# Patient Record
Sex: FEMALE | Race: WHITE | NOT HISPANIC OR LATINO
[De-identification: names, ages, dates, MRNs, and addresses within clinical notes are randomized per-mention and may not be internally consistent; named-entity substitution may affect disease eponyms.]

---

## 2017-01-30 ENCOUNTER — TRANSCRIPTION ENCOUNTER (OUTPATIENT)
Age: 42
End: 2017-01-30

## 2017-10-09 ENCOUNTER — APPOINTMENT (OUTPATIENT)
Dept: OBGYN | Facility: CLINIC | Age: 42
End: 2017-10-09
Payer: COMMERCIAL

## 2017-10-09 VITALS
SYSTOLIC BLOOD PRESSURE: 137 MMHG | HEIGHT: 64 IN | WEIGHT: 159 LBS | DIASTOLIC BLOOD PRESSURE: 85 MMHG | BODY MASS INDEX: 27.14 KG/M2

## 2017-10-09 PROCEDURE — 99396 PREV VISIT EST AGE 40-64: CPT

## 2017-10-10 LAB — HPV HIGH+LOW RISK DNA PNL CVX: NOT DETECTED

## 2017-10-13 LAB — CYTOLOGY CVX/VAG DOC THIN PREP: NORMAL

## 2017-11-08 ENCOUNTER — TRANSCRIPTION ENCOUNTER (OUTPATIENT)
Age: 42
End: 2017-11-08

## 2017-11-13 ENCOUNTER — APPOINTMENT (OUTPATIENT)
Dept: ORTHOPEDIC SURGERY | Facility: CLINIC | Age: 42
End: 2017-11-13
Payer: COMMERCIAL

## 2017-11-13 VITALS — DIASTOLIC BLOOD PRESSURE: 89 MMHG | HEART RATE: 89 BPM | SYSTOLIC BLOOD PRESSURE: 145 MMHG

## 2017-11-13 DIAGNOSIS — Z87.898 PERSONAL HISTORY OF OTHER SPECIFIED CONDITIONS: ICD-10-CM

## 2017-11-13 DIAGNOSIS — F41.1 GENERALIZED ANXIETY DISORDER: ICD-10-CM

## 2017-11-13 DIAGNOSIS — Z87.891 PERSONAL HISTORY OF NICOTINE DEPENDENCE: ICD-10-CM

## 2017-11-13 PROCEDURE — 99204 OFFICE O/P NEW MOD 45 MIN: CPT

## 2017-11-13 PROCEDURE — 72100 X-RAY EXAM L-S SPINE 2/3 VWS: CPT

## 2017-12-01 ENCOUNTER — APPOINTMENT (OUTPATIENT)
Dept: ORTHOPEDIC SURGERY | Facility: CLINIC | Age: 42
End: 2017-12-01
Payer: COMMERCIAL

## 2017-12-01 PROCEDURE — 99214 OFFICE O/P EST MOD 30 MIN: CPT

## 2017-12-16 ENCOUNTER — APPOINTMENT (OUTPATIENT)
Dept: MRI IMAGING | Facility: CLINIC | Age: 42
End: 2017-12-16
Payer: COMMERCIAL

## 2017-12-16 ENCOUNTER — OUTPATIENT (OUTPATIENT)
Dept: OUTPATIENT SERVICES | Facility: HOSPITAL | Age: 42
LOS: 1 days | End: 2017-12-16
Payer: COMMERCIAL

## 2017-12-16 DIAGNOSIS — M54.16 RADICULOPATHY, LUMBAR REGION: ICD-10-CM

## 2017-12-16 PROCEDURE — 72148 MRI LUMBAR SPINE W/O DYE: CPT | Mod: 26

## 2017-12-16 PROCEDURE — 72148 MRI LUMBAR SPINE W/O DYE: CPT

## 2017-12-20 ENCOUNTER — APPOINTMENT (OUTPATIENT)
Dept: ORTHOPEDIC SURGERY | Facility: CLINIC | Age: 42
End: 2017-12-20

## 2018-01-05 ENCOUNTER — INPATIENT (INPATIENT)
Facility: HOSPITAL | Age: 43
LOS: 1 days | Discharge: HOME | DRG: 552 | End: 2018-01-07
Attending: INTERNAL MEDICINE | Admitting: INTERNAL MEDICINE
Payer: COMMERCIAL

## 2018-01-05 VITALS
RESPIRATION RATE: 18 BRPM | TEMPERATURE: 99 F | OXYGEN SATURATION: 99 % | DIASTOLIC BLOOD PRESSURE: 95 MMHG | HEART RATE: 102 BPM | SYSTOLIC BLOOD PRESSURE: 169 MMHG

## 2018-01-05 DIAGNOSIS — M54.5 LOW BACK PAIN: ICD-10-CM

## 2018-01-05 LAB
ALBUMIN SERPL ELPH-MCNC: 4.4 G/DL — SIGNIFICANT CHANGE UP (ref 3.3–5)
ALP SERPL-CCNC: 81 U/L — SIGNIFICANT CHANGE UP (ref 40–120)
ALT FLD-CCNC: 14 U/L RC — SIGNIFICANT CHANGE UP (ref 10–45)
ANION GAP SERPL CALC-SCNC: 12 MMOL/L — SIGNIFICANT CHANGE UP (ref 5–17)
APPEARANCE UR: ABNORMAL
APTT BLD: 29.6 SEC — SIGNIFICANT CHANGE UP (ref 27.5–37.4)
AST SERPL-CCNC: 14 U/L — SIGNIFICANT CHANGE UP (ref 10–40)
BACTERIA # UR AUTO: ABNORMAL /HPF
BASE EXCESS BLDV CALC-SCNC: 3.2 MMOL/L — HIGH (ref -2–2)
BASOPHILS # BLD AUTO: 0.1 K/UL — SIGNIFICANT CHANGE UP (ref 0–0.2)
BASOPHILS NFR BLD AUTO: 0.6 % — SIGNIFICANT CHANGE UP (ref 0–2)
BILIRUB SERPL-MCNC: 0.2 MG/DL — SIGNIFICANT CHANGE UP (ref 0.2–1.2)
BILIRUB UR-MCNC: NEGATIVE — SIGNIFICANT CHANGE UP
BLD GP AB SCN SERPL QL: NEGATIVE — SIGNIFICANT CHANGE UP
BUN SERPL-MCNC: 13 MG/DL — SIGNIFICANT CHANGE UP (ref 7–23)
CA-I SERPL-SCNC: 1.2 MMOL/L — SIGNIFICANT CHANGE UP (ref 1.12–1.3)
CALCIUM SERPL-MCNC: 9.5 MG/DL — SIGNIFICANT CHANGE UP (ref 8.4–10.5)
CHLORIDE BLDV-SCNC: 106 MMOL/L — SIGNIFICANT CHANGE UP (ref 96–108)
CHLORIDE SERPL-SCNC: 100 MMOL/L — SIGNIFICANT CHANGE UP (ref 96–108)
CO2 BLDV-SCNC: 30 MMOL/L — SIGNIFICANT CHANGE UP (ref 22–30)
CO2 SERPL-SCNC: 26 MMOL/L — SIGNIFICANT CHANGE UP (ref 22–31)
COLOR SPEC: SIGNIFICANT CHANGE UP
CREAT SERPL-MCNC: 0.74 MG/DL — SIGNIFICANT CHANGE UP (ref 0.5–1.3)
DIFF PNL FLD: NEGATIVE — SIGNIFICANT CHANGE UP
EOSINOPHIL # BLD AUTO: 0.1 K/UL — SIGNIFICANT CHANGE UP (ref 0–0.5)
EOSINOPHIL NFR BLD AUTO: 0.9 % — SIGNIFICANT CHANGE UP (ref 0–6)
EPI CELLS # UR: ABNORMAL /HPF
GAS PNL BLDV: 136 MMOL/L — SIGNIFICANT CHANGE UP (ref 136–145)
GAS PNL BLDV: SIGNIFICANT CHANGE UP
GAS PNL BLDV: SIGNIFICANT CHANGE UP
GLUCOSE BLDV-MCNC: 96 MG/DL — SIGNIFICANT CHANGE UP (ref 70–99)
GLUCOSE SERPL-MCNC: 101 MG/DL — HIGH (ref 70–99)
GLUCOSE UR QL: NEGATIVE — SIGNIFICANT CHANGE UP
HCG SERPL-ACNC: <2 MIU/ML — LOW (ref 5–24)
HCO3 BLDV-SCNC: 28 MMOL/L — SIGNIFICANT CHANGE UP (ref 21–29)
HCT VFR BLD CALC: 38.3 % — SIGNIFICANT CHANGE UP (ref 34.5–45)
HCT VFR BLDA CALC: 39 % — SIGNIFICANT CHANGE UP (ref 39–50)
HGB BLD CALC-MCNC: 12.8 G/DL — SIGNIFICANT CHANGE UP (ref 11.5–15.5)
HGB BLD-MCNC: 12.5 G/DL — SIGNIFICANT CHANGE UP (ref 11.5–15.5)
INR BLD: 1.01 RATIO — SIGNIFICANT CHANGE UP (ref 0.88–1.16)
KETONES UR-MCNC: NEGATIVE — SIGNIFICANT CHANGE UP
LACTATE BLDV-MCNC: 1.6 MMOL/L — SIGNIFICANT CHANGE UP (ref 0.7–2)
LEUKOCYTE ESTERASE UR-ACNC: ABNORMAL
LIDOCAIN IGE QN: 34 U/L — SIGNIFICANT CHANGE UP (ref 7–60)
LYMPHOCYTES # BLD AUTO: 33.1 % — SIGNIFICANT CHANGE UP (ref 13–44)
LYMPHOCYTES # BLD AUTO: 4 K/UL — HIGH (ref 1–3.3)
MCHC RBC-ENTMCNC: 26 PG — LOW (ref 27–34)
MCHC RBC-ENTMCNC: 32.6 GM/DL — SIGNIFICANT CHANGE UP (ref 32–36)
MCV RBC AUTO: 79.7 FL — LOW (ref 80–100)
MONOCYTES # BLD AUTO: 0.9 K/UL — SIGNIFICANT CHANGE UP (ref 0–0.9)
MONOCYTES NFR BLD AUTO: 7.5 % — SIGNIFICANT CHANGE UP (ref 2–14)
NEUTROPHILS # BLD AUTO: 7 K/UL — SIGNIFICANT CHANGE UP (ref 1.8–7.4)
NEUTROPHILS NFR BLD AUTO: 57.9 % — SIGNIFICANT CHANGE UP (ref 43–77)
NITRITE UR-MCNC: NEGATIVE — SIGNIFICANT CHANGE UP
PCO2 BLDV: 46 MMHG — SIGNIFICANT CHANGE UP (ref 35–50)
PH BLDV: 7.4 — SIGNIFICANT CHANGE UP (ref 7.35–7.45)
PH UR: 7 — SIGNIFICANT CHANGE UP (ref 5–8)
PLATELET # BLD AUTO: 416 K/UL — HIGH (ref 150–400)
PO2 BLDV: 32 MMHG — SIGNIFICANT CHANGE UP (ref 25–45)
POTASSIUM BLDV-SCNC: 3.9 MMOL/L — SIGNIFICANT CHANGE UP (ref 3.5–5)
POTASSIUM SERPL-MCNC: 4.1 MMOL/L — SIGNIFICANT CHANGE UP (ref 3.5–5.3)
POTASSIUM SERPL-SCNC: 4.1 MMOL/L — SIGNIFICANT CHANGE UP (ref 3.5–5.3)
PROT SERPL-MCNC: 7.8 G/DL — SIGNIFICANT CHANGE UP (ref 6–8.3)
PROT UR-MCNC: NEGATIVE — SIGNIFICANT CHANGE UP
PROTHROM AB SERPL-ACNC: 10.9 SEC — SIGNIFICANT CHANGE UP (ref 9.8–12.7)
RBC # BLD: 4.81 M/UL — SIGNIFICANT CHANGE UP (ref 3.8–5.2)
RBC # FLD: 14.3 % — SIGNIFICANT CHANGE UP (ref 10.3–14.5)
RH IG SCN BLD-IMP: POSITIVE — SIGNIFICANT CHANGE UP
SAO2 % BLDV: 55 % — LOW (ref 67–88)
SODIUM SERPL-SCNC: 138 MMOL/L — SIGNIFICANT CHANGE UP (ref 135–145)
SP GR SPEC: 1.01 — SIGNIFICANT CHANGE UP (ref 1.01–1.02)
UROBILINOGEN FLD QL: NEGATIVE — SIGNIFICANT CHANGE UP
WBC # BLD: 12 K/UL — HIGH (ref 3.8–10.5)
WBC # FLD AUTO: 12 K/UL — HIGH (ref 3.8–10.5)
WBC UR QL: ABNORMAL /HPF (ref 0–5)

## 2018-01-05 PROCEDURE — 93010 ELECTROCARDIOGRAM REPORT: CPT

## 2018-01-05 PROCEDURE — 72149 MRI LUMBAR SPINE W/DYE: CPT | Mod: 26

## 2018-01-05 PROCEDURE — 99285 EMERGENCY DEPT VISIT HI MDM: CPT | Mod: 25

## 2018-01-05 PROCEDURE — 71045 X-RAY EXAM CHEST 1 VIEW: CPT | Mod: 26

## 2018-01-05 RX ORDER — SODIUM CHLORIDE 9 MG/ML
3 INJECTION INTRAMUSCULAR; INTRAVENOUS; SUBCUTANEOUS ONCE
Qty: 0 | Refills: 0 | Status: COMPLETED | OUTPATIENT
Start: 2018-01-05 | End: 2018-01-05

## 2018-01-05 RX ORDER — MORPHINE SULFATE 50 MG/1
4 CAPSULE, EXTENDED RELEASE ORAL ONCE
Qty: 0 | Refills: 0 | Status: DISCONTINUED | OUTPATIENT
Start: 2018-01-05 | End: 2018-01-05

## 2018-01-05 RX ORDER — CEFTRIAXONE 500 MG/1
1 INJECTION, POWDER, FOR SOLUTION INTRAMUSCULAR; INTRAVENOUS ONCE
Qty: 0 | Refills: 0 | Status: COMPLETED | OUTPATIENT
Start: 2018-01-05 | End: 2018-01-05

## 2018-01-05 RX ADMIN — MORPHINE SULFATE 4 MILLIGRAM(S): 50 CAPSULE, EXTENDED RELEASE ORAL at 18:27

## 2018-01-05 RX ADMIN — MORPHINE SULFATE 4 MILLIGRAM(S): 50 CAPSULE, EXTENDED RELEASE ORAL at 23:16

## 2018-01-05 RX ADMIN — MORPHINE SULFATE 4 MILLIGRAM(S): 50 CAPSULE, EXTENDED RELEASE ORAL at 22:01

## 2018-01-05 RX ADMIN — CEFTRIAXONE 100 GRAM(S): 500 INJECTION, POWDER, FOR SOLUTION INTRAMUSCULAR; INTRAVENOUS at 23:40

## 2018-01-05 RX ADMIN — SODIUM CHLORIDE 3 MILLILITER(S): 9 INJECTION INTRAMUSCULAR; INTRAVENOUS; SUBCUTANEOUS at 18:20

## 2018-01-05 NOTE — ED PROVIDER NOTE - MEDICAL DECISION MAKING DETAILS
41 yo F with worsening back pain after cortisone injection for 3 days. Lumbar paraspinal tenderness on palpation with positive straight leg b/l with right worse than left. Will consult ortho/spine, obtain basic labs, provide pain control. 41 yo F with worsening back pain after cortisone injection for 3 days. Lumbar paraspinal tenderness on palpation with positive straight leg b/l with right worse than left. Concern for epidural abscess. Will consult ortho/spine, MRI to r/o abscess, obtain basic labs, provide pain control. 43 yo F with worsening back pain after cortisone injection for 3 days. Lumbar paraspinal tenderness on palpation with positive straight leg b/l with right worse than left. Concern for epidural abscess. Will consult ortho/spine, obtain MRI to r/o abscess, obtain basic labs, UA, provide pain control. 43 yo F with worsening back pain after cortisone injection for 3 days. Lumbar paraspinal tenderness on palpation with positive straight leg b/l with right worse than left, normal rectal tone. Concern for epidural abscess. Will consult ortho/spine, obtain MRI to r/o abscess, obtain basic labs, UA, provide pain control.

## 2018-01-05 NOTE — CONSULT NOTE ADULT - ASSESSMENT
42F with L5 isthmic spondy and known L5-S1 HNP, here for worsened back pain and RLE radiculopathy   - Management options discussed with patient, including continued conservative management as well as operative intervention  - At this time, patient would prefer to pursue non-operative management   - FU repeat MRI L spine official read  - ESR 12, FU CRP  - WBAT  - Pain control and follow-up with Dr. Reyes as outpatient [phone] vs admit to med for pain control if necessary  - Discussed with attendings Dr. Elkins and Dr. Reyes

## 2018-01-05 NOTE — ED PROVIDER NOTE - OBJECTIVE STATEMENT
41 yo F w/PMH herniated disc (seen on MRI 12/16/17) sent in by orthopedics (Dr. Oscar Elkins) for worsening back pain x 3 days. Pt had cortisone injection 3 days ago in her lower back and since has been having worsening back pain with radiation to right leg that's worse with movement and abdominal pain. Denies fevers, bowel or bladder incontinence, n/v, CP, SOB. No allergies.   PCP Dr. Melissa Mares  [phone]

## 2018-01-05 NOTE — ED ADULT NURSE NOTE - OBJECTIVE STATEMENT
43 yo female A&OX3 presents to the ED with the c/o back pain s/p getting and epidural. Pt has a hx of herniated disc. Pt states that she had injection on tue, states that she had sudden back pain. Pt c/o diff walking and trouble standing for long periods of time. No diff urinating or having a BM. Denies fevers and chills. Back appears normal in color. ++ tenderness to touch. MAEX4

## 2018-01-05 NOTE — ED PROVIDER NOTE - PROGRESS NOTE DETAILS
Spoke to orthopedics. Requesting coags, T&S, ESR, CRP labs (ordered) and to hold ABX for now. Discussed with Dr Gerri Garcia [facility]  Juma Doss MD, Facep

## 2018-01-05 NOTE — CONSULT NOTE ADULT - SUBJECTIVE AND OBJECTIVE BOX
CC: LBP with RLE radiculopathy  HPI:   42yFemale here for worsening LBP s/p lumbar epidural steroid injection. Patient has had about 2 months of LBP, insidious onset with no apparent trigger or injury. She has been seen Dr. Elkins ([facility]). Her pain has been refractory to NSAIDs, PT, and she was sent for a lumbar steroid injection on 1/2. She was fine and ambulatory afterwards up until today when she experienced acute worsening of her back pain. She spoke to Dr. Elkins who advised her to come to [facility] ED. She states she has some tingling of her R foot, moreso on the plantar aspect. She denies having any weakness. She is able to ambulate but currently is limited by her back pain. She states her back pain is persistent and at times can be 10/10. She denies any fevers or chills at home. No falls, no trauma. She denies any changes in bowel/bladder habits, no saddle anesthesia.     Review of systems: As per HPI, otherwise negative.     PAST MEDICAL & SURGICAL HISTORY:  No pertinent past medical history  No significant past surgical history    FAMILY HISTORY:    MEDICATIONS  (STANDING):    MEDICATIONS  (PRN):    T(C): 37.3 (01-05-18 @ 17:33), Max: 37.3 (01-05-18 @ 17:33)  HR: 102 (01-05-18 @ 17:33) (102 - 102)  BP: 169/95 (01-05-18 @ 17:33) (169/95 - 169/95)  RR: 18 (01-05-18 @ 17:33) (18 - 18)  SpO2: 99% (01-05-18 @ 17:33) (99% - 99%)  Wt(kg): --                        12.5   12.0  )-----------( 416      ( 05 Jan 2018 18:42 )             38.3     01-05    138  |  100  |  13  ----------------------------<  101<H>  4.1   |  26  |  0.74    Ca    9.5      05 Jan 2018 18:42    TPro  7.8  /  Alb  4.4  /  TBili  0.2  /  DBili  x   /  AST  14  /  ALT  14  /  AlkPhos  81  01-05    ESR 12  CRP pending    EXAM:  Awake, Alert and in no acute distress  Back some TTP at L5-S1 level  B/L LE 5/5 IP/H/Q/TA/EHL/GS/FHL  SILT L2-S1, RLE decr sensation S1>L5   Bilat patellar and Achilles DTRs symmetric  Neg clonus, neg Babinski  WWP brisk cap refill    Imaging  MRI L spine from 12/16 reviewed, L5 isthmic spondy, R paracentral to far lateral disc herniation affecting R S1 nerve root

## 2018-01-06 ENCOUNTER — TRANSCRIPTION ENCOUNTER (OUTPATIENT)
Age: 43
End: 2018-01-06

## 2018-01-06 DIAGNOSIS — M54.5 LOW BACK PAIN: ICD-10-CM

## 2018-01-06 DIAGNOSIS — Z29.9 ENCOUNTER FOR PROPHYLACTIC MEASURES, UNSPECIFIED: ICD-10-CM

## 2018-01-06 PROCEDURE — 99223 1ST HOSP IP/OBS HIGH 75: CPT | Mod: GC

## 2018-01-06 PROCEDURE — 12345: CPT | Mod: GC,NC

## 2018-01-06 RX ORDER — ONDANSETRON 8 MG/1
4 TABLET, FILM COATED ORAL EVERY 6 HOURS
Qty: 0 | Refills: 0 | Status: DISCONTINUED | OUTPATIENT
Start: 2018-01-06 | End: 2018-01-06

## 2018-01-06 RX ORDER — GABAPENTIN 400 MG/1
900 CAPSULE ORAL
Qty: 0 | Refills: 0 | Status: DISCONTINUED | OUTPATIENT
Start: 2018-01-06 | End: 2018-01-07

## 2018-01-06 RX ORDER — CEFTRIAXONE 500 MG/1
1 INJECTION, POWDER, FOR SOLUTION INTRAMUSCULAR; INTRAVENOUS EVERY 24 HOURS
Qty: 0 | Refills: 0 | Status: DISCONTINUED | OUTPATIENT
Start: 2018-01-06 | End: 2018-01-07

## 2018-01-06 RX ORDER — ONDANSETRON 8 MG/1
4 TABLET, FILM COATED ORAL ONCE
Qty: 0 | Refills: 0 | Status: COMPLETED | OUTPATIENT
Start: 2018-01-06 | End: 2018-01-06

## 2018-01-06 RX ORDER — GABAPENTIN 400 MG/1
600 CAPSULE ORAL EVERY 8 HOURS
Qty: 0 | Refills: 0 | Status: DISCONTINUED | OUTPATIENT
Start: 2018-01-06 | End: 2018-01-06

## 2018-01-06 RX ORDER — HYDROMORPHONE HYDROCHLORIDE 2 MG/ML
0.5 INJECTION INTRAMUSCULAR; INTRAVENOUS; SUBCUTANEOUS ONCE
Qty: 0 | Refills: 0 | Status: DISCONTINUED | OUTPATIENT
Start: 2018-01-06 | End: 2018-01-06

## 2018-01-06 RX ORDER — KETOROLAC TROMETHAMINE 30 MG/ML
30 SYRINGE (ML) INJECTION EVERY 6 HOURS
Qty: 0 | Refills: 0 | Status: DISCONTINUED | OUTPATIENT
Start: 2018-01-06 | End: 2018-01-06

## 2018-01-06 RX ORDER — METOCLOPRAMIDE HCL 10 MG
10 TABLET ORAL EVERY 6 HOURS
Qty: 0 | Refills: 0 | Status: DISCONTINUED | OUTPATIENT
Start: 2018-01-06 | End: 2018-01-06

## 2018-01-06 RX ORDER — OXYCODONE AND ACETAMINOPHEN 5; 325 MG/1; MG/1
1 TABLET ORAL EVERY 6 HOURS
Qty: 0 | Refills: 0 | Status: DISCONTINUED | OUTPATIENT
Start: 2018-01-06 | End: 2018-01-06

## 2018-01-06 RX ORDER — GABAPENTIN 400 MG/1
600 CAPSULE ORAL
Qty: 0 | Refills: 0 | Status: DISCONTINUED | OUTPATIENT
Start: 2018-01-06 | End: 2018-01-07

## 2018-01-06 RX ORDER — SODIUM CHLORIDE 9 MG/ML
1000 INJECTION INTRAMUSCULAR; INTRAVENOUS; SUBCUTANEOUS
Qty: 0 | Refills: 0 | Status: COMPLETED | OUTPATIENT
Start: 2018-01-06 | End: 2018-01-06

## 2018-01-06 RX ORDER — MORPHINE SULFATE 50 MG/1
2 CAPSULE, EXTENDED RELEASE ORAL EVERY 6 HOURS
Qty: 0 | Refills: 0 | Status: DISCONTINUED | OUTPATIENT
Start: 2018-01-06 | End: 2018-01-06

## 2018-01-06 RX ORDER — TRAMADOL HYDROCHLORIDE 50 MG/1
50 TABLET ORAL EVERY 6 HOURS
Qty: 0 | Refills: 0 | Status: DISCONTINUED | OUTPATIENT
Start: 2018-01-06 | End: 2018-01-07

## 2018-01-06 RX ORDER — ENOXAPARIN SODIUM 100 MG/ML
40 INJECTION SUBCUTANEOUS DAILY
Qty: 0 | Refills: 0 | Status: DISCONTINUED | OUTPATIENT
Start: 2018-01-06 | End: 2018-01-07

## 2018-01-06 RX ORDER — INFLUENZA VIRUS VACCINE 15; 15; 15; 15 UG/.5ML; UG/.5ML; UG/.5ML; UG/.5ML
0.5 SUSPENSION INTRAMUSCULAR ONCE
Qty: 0 | Refills: 0 | Status: DISCONTINUED | OUTPATIENT
Start: 2018-01-06 | End: 2018-01-07

## 2018-01-06 RX ORDER — METOCLOPRAMIDE HCL 10 MG
10 TABLET ORAL EVERY 6 HOURS
Qty: 0 | Refills: 0 | Status: DISCONTINUED | OUTPATIENT
Start: 2018-01-06 | End: 2018-01-07

## 2018-01-06 RX ORDER — SODIUM CHLORIDE 9 MG/ML
1000 INJECTION INTRAMUSCULAR; INTRAVENOUS; SUBCUTANEOUS ONCE
Qty: 0 | Refills: 0 | Status: DISCONTINUED | OUTPATIENT
Start: 2018-01-06 | End: 2018-01-06

## 2018-01-06 RX ADMIN — Medication 10 MILLIGRAM(S): at 09:34

## 2018-01-06 RX ADMIN — GABAPENTIN 600 MILLIGRAM(S): 400 CAPSULE ORAL at 11:27

## 2018-01-06 RX ADMIN — Medication 30 MILLIGRAM(S): at 16:32

## 2018-01-06 RX ADMIN — SODIUM CHLORIDE 100 MILLILITER(S): 9 INJECTION INTRAMUSCULAR; INTRAVENOUS; SUBCUTANEOUS at 08:56

## 2018-01-06 RX ADMIN — ONDANSETRON 4 MILLIGRAM(S): 8 TABLET, FILM COATED ORAL at 06:48

## 2018-01-06 RX ADMIN — MORPHINE SULFATE 4 MILLIGRAM(S): 50 CAPSULE, EXTENDED RELEASE ORAL at 01:19

## 2018-01-06 RX ADMIN — GABAPENTIN 600 MILLIGRAM(S): 400 CAPSULE ORAL at 18:01

## 2018-01-06 RX ADMIN — GABAPENTIN 900 MILLIGRAM(S): 400 CAPSULE ORAL at 22:27

## 2018-01-06 RX ADMIN — HYDROMORPHONE HYDROCHLORIDE 0.5 MILLIGRAM(S): 2 INJECTION INTRAMUSCULAR; INTRAVENOUS; SUBCUTANEOUS at 02:28

## 2018-01-06 RX ADMIN — ONDANSETRON 4 MILLIGRAM(S): 8 TABLET, FILM COATED ORAL at 02:28

## 2018-01-06 RX ADMIN — Medication 30 MILLIGRAM(S): at 17:27

## 2018-01-06 RX ADMIN — CEFTRIAXONE 100 GRAM(S): 500 INJECTION, POWDER, FOR SOLUTION INTRAMUSCULAR; INTRAVENOUS at 21:16

## 2018-01-06 RX ADMIN — SODIUM CHLORIDE 100 MILLILITER(S): 9 INJECTION INTRAMUSCULAR; INTRAVENOUS; SUBCUTANEOUS at 11:27

## 2018-01-06 RX ADMIN — ENOXAPARIN SODIUM 40 MILLIGRAM(S): 100 INJECTION SUBCUTANEOUS at 11:27

## 2018-01-06 NOTE — PROGRESS NOTE ADULT - SUBJECTIVE AND OBJECTIVE BOX
Patient is a 42y old  Female who presents with a chief complaint of lower back pain (2018 04:49)      SUBJECTIVE / OVERNIGHT EVENTS:  Overnight, pt continued to have significant back and RLE pain. Pt says that IV pain medication was not effective at all and that it caused her to have significant N/V. Pt states that her pain is 8/10 in intensity. Pt is also complaining of suprapubic pain. Pt has no other complaints and denies any fevers/chills, CP, palpitations, SOB, D/C, dysuria.    MEDICATIONS  (STANDING):  cefTRIAXone   IVPB 1 Gram(s) IV Intermittent every 24 hours  enoxaparin Injectable 40 milliGRAM(s) SubCutaneous daily  gabapentin 600 milliGRAM(s) Oral every 8 hours  influenza   Vaccine 0.5 milliLiter(s) IntraMuscular once  ketorolac   Injectable 30 milliGRAM(s) IV Push every 6 hours  sodium chloride 0.9%. 1000 milliLiter(s) (100 mL/Hr) IV Continuous <Continuous>    MEDICATIONS  (PRN):  metoclopramide 10 milliGRAM(s) Oral every 6 hours PRN Nausea and Vomiting  morphine  - Injectable 2 milliGRAM(s) IV Push every 6 hours PRN breakthrough pain  oxyCODONE    5 mG/acetaminophen 325 mG 1 Tablet(s) Oral every 6 hours PRN Moderate Pain (4 - 6)      OBJECTIVE:    Vital Signs Last 24 Hrs  T(C): 36.6 (2018 04:32), Max: 37.3 (2018 17:33)  T(F): 97.9 (2018 04:32), Max: 99.2 (2018 17:33)  HR: 65 (2018 04:32) (65 - 102)  BP: 117/78 (2018 04:32) (117/78 - 169/95)  BP(mean): --  RR: 18 (2018 04:32) (16 - 18)  SpO2: 99% (2018 04:32) (99% - 100%)    CAPILLARY BLOOD GLUCOSE        I&O's Summary    2018 07:01  -  2018 07:00  --------------------------------------------------------  IN: 600 mL / OUT: 0 mL / NET: 600 mL    2018 07:01  -  2018 09:26  --------------------------------------------------------  IN: 200 mL / OUT: 0 mL / NET: 200 mL        PHYSICAL EXAM:  GENERAL: NAD, well-developed  HEAD:  Atraumatic, Normocephalic  EYES: EOMI, PERRLA, conjunctiva and sclera clear  NECK: Supple, No JVD  BACK: no CVA tenderness. TTP over the lower lumbar/ upper sacral spine  CHEST/LUNG: Clear to auscultation bilaterally; No wheeze  HEART: Regular rate and rhythm; No murmurs, rubs, or gallops  ABDOMEN: Soft, Nondistended; Bowel sounds present. TTP in the suprapubic area.  EXTREMITIES:  2+ Peripheral Pulses, No clubbing, cyanosis, or edema  PSYCH: AAOx3  NEUROLOGY: non-focal  SKIN: No rashes or lesions    LABS:                        12.5   12.0  )-----------( 416      ( 2018 18:42 )             38.3     Auto Eosinophil # 0.1   / Auto Eosinophil % 0.9   / Auto Neutrophil # 7.0   / Auto Neutrophil % 57.9  / BANDS % x        -05    138  |  100  |  13  ----------------------------<  101<H>  4.1   |  26  |  0.74    Ca    9.5      2018 18:42  TPro  7.8  /  Alb  4.4  /  TBili  0.2  /  DBili  x   /  AST  14  /  ALT  14  /  AlkPhos  81  -    PT/INR - ( 2018 19:38 )   PT: 10.9 sec;   INR: 1.01 ratio         PTT - ( 2018 19:38 )  PTT:29.6 sec      Urinalysis Basic - ( 2018 19:38 )    Color: PL Yellow / Appearance: SL Turbid / S.011 / pH: x  Gluc: x / Ketone: Negative  / Bili: Negative / Urobili: Negative   Blood: x / Protein: Negative / Nitrite: Negative   Leuk Esterase: Moderate / RBC: x / WBC 5-10 /HPF   Sq Epi: x / Non Sq Epi: Many /HPF / Bacteria: Many /HPF          RESPIRATORY  VENT:    ABG:     VBG: ( 18:42 ) - VBG - pH: 7.40  | pCO2: 46    | pO2: 32    | Lactate: 1.6        RADIOLOGY & ADDITIONAL TESTS:  (Imaging Personally Reviewed)    Consultant(s) Notes Reviewed:      Care Discussed with Consultants/Other Providers:

## 2018-01-06 NOTE — DISCHARGE NOTE ADULT - HOSPITAL COURSE
Pt is a 41 yo F w/ Hx of herniated disc, who presented to the ER for worsening lower back pain after cortisone injection from 1/3. Pt received an MRI on admission which revealed grade 1 anterolisthesis at L5-S1 secondary to chronic  bilateral L5 pars defects with uncovering of the posterior aspect of the intervertebral disc, and a moderate-sized right paracentral disc herniation which slightly posteriorly displaces the descending right S1 nerve root. Pt was seen by ortho who recommended conservative treatment and pain control...... Pt is a 43 yo F w/ Hx of herniated disc, who presented to the ER for worsening lower back pain after cortisone injection from 1/3. Pt received an MRI on admission which revealed grade 1 anterolisthesis at L5-S1 secondary to chronic  bilateral L5 pars defects with uncovering of the posterior aspect of the intervertebral disc, and a moderate-sized right paracentral disc herniation which slightly posteriorly displaces the descending right S1 nerve root. Pt was seen by ortho who recommended conservative treatment and pain control. Patient also c/o dysuria, started on ceftraixone in setting of +UA and discharged with 2 additional days of bactrim. Patient discharged on uptitrated dose of gabapentin with plans to follow-up with spinal surgeon tomorrow, 1/8/18.

## 2018-01-06 NOTE — H&P ADULT - HISTORY OF PRESENT ILLNESS
41yo F w/ Hx of herniated disc presented to ER for lower back pain control. Patient reported lower back pain started ~2 months ago. Failed NSAIDs and PT for pain control. 12/16 MRI identified L5/S1 herniation. Patient presented to ortho office 4 days ago for cortisone injection. She was fine and ambulatory afterwards up until today when she experienced acute worsening of her back pain. She spoke to Dr. Elkins (ortho) who advised her to come to [facility] ED. She states she has some tingling of her R foot, more so on the plantar aspect. She denies having any weakness. She is able to ambulate but currently is limited by her back pain. She states her back pain is persistent and at times can be 10/10. She denies any fevers or chills at home. No falls, no trauma. Otherwise, no abd pain, chest pain, urinary burning sensation. In the ER, patient remains VSSAF. Patient was given morphine 4mg x 2 as well as dilaudid for pain control. However, patient experienced significant SE including flushing, sob, and sedation. Patient was also given ceftriaxone 1gram for suspected UTI.

## 2018-01-06 NOTE — DISCHARGE NOTE ADULT - CARE PLAN
Principal Discharge DX:	Low back pain, unspecified back pain laterality, unspecified chronicity, with sciatica presence unspecified  Goal:	Pain control  Instructions for follow-up, activity and diet:	You came in because of worsening back pain following your steroid injection. You received imaging, which did not show a cause of your pain, besides your herniated disc. While in pain, please do not lift anything greater that 5 lbs. Please continue to take all medications as prescribed.  Secondary Diagnosis:	Urinary tract infection without hematuria, site unspecified  Goal:	Resolution  Instructions for follow-up, activity and diet:	While here you were found to have a UTI which was causing your lower abdominal pain. You were treated with IV antibiotics while here, and are being sent home with oral antibiotics for the rest of your antibiotics course. Please continue taking all medications as prescribed. Please follow up with your PCP within 1-2 weeks of discharge for further management.

## 2018-01-06 NOTE — H&P ADULT - NSHPLABSRESULTS_GEN_ALL_CORE
( @ 18:42)                      12.5  12.0 )-----------( 416                 38.3    Neutrophils = 7.0 (57.9%)  Lymphocytes = 4.0 (33.1%)  Eosinophils = 0.1 (0.9%)  Basophils = 0.1 (0.6%)  Monocytes = 0.9 (7.5%)  Bands = --%        138  |  100  |  13  ----------------------------<  101<H>  4.1   |  26  |  0.74    Ca    9.5      2018 18:42    TPro  7.8  /  Alb  4.4  /  TBili  0.2  /  DBili  x   /  AST  14  /  ALT  14  /  AlkPhos  81      ( 2018 19:38 )   PT: 10.9 sec;   INR: 1.01 ratio;       PTT:29.6 sec    Venous Blood Gas:   @ 18:42  7.40/46/32/28/55  VBG Lactate: 1.6    Urinalysis Basic - ( 2018 19:38 )    Color: PL Yellow / Appearance: SL Turbid / S.011 / pH: x  Gluc: x / Ketone: Negative  / Bili: Negative / Urobili: Negative   Blood: x / Protein: Negative / Nitrite: Negative   Leuk Esterase: Moderate / RBC: x / WBC 5-10 /HPF   Sq Epi: x / Non Sq Epi: Many /HPF / Bacteria: Many /HPF    MRI: No evidence of epidural abscess. Unchanged moderate right   paracentral/foraminal disc protrusion at L5/S1 with contact of the downgoing   right S1 nerve root. Peripheral enhancement around the disc protrusion   suggest presence of inflammation. Personally reviewed labs:    ( @ 18:42)                      12.5  12.0 )-----------( 416                 38.3    Neutrophils = 7.0 (57.9%)  Lymphocytes = 4.0 (33.1%)  Eosinophils = 0.1 (0.9%)  Basophils = 0.1 (0.6%)  Monocytes = 0.9 (7.5%)  Bands = --%        138  |  100  |  13  ----------------------------<  101<H>  4.1   |  26  |  0.74    Ca    9.5      2018 18:42    TPro  7.8  /  Alb  4.4  /  TBili  0.2  /  DBili  x   /  AST  14  /  ALT  14  /  AlkPhos  81      ( 2018 19:38 )   PT: 10.9 sec;   INR: 1.01 ratio;       PTT:29.6 sec    Venous Blood Gas:   @ 18:42  7.40/46/32/28/55  VBG Lactate: 1.6    Urinalysis Basic - ( 2018 19:38 )    Color: PL Yellow / Appearance: SL Turbid / S.011 / pH: x  Gluc: x / Ketone: Negative  / Bili: Negative / Urobili: Negative   Blood: x / Protein: Negative / Nitrite: Negative   Leuk Esterase: Moderate / RBC: x / WBC 5-10 /HPF   Sq Epi: x / Non Sq Epi: Many /HPF / Bacteria: Many /HPF    Reviewed MRI brain  MRI: No evidence of epidural abscess. Unchanged moderate right   paracentral/foraminal disc protrusion at L5/S1 with contact of the downgoing   right S1 nerve root. Peripheral enhancement around the disc protrusion   suggest presence of inflammation.    Personally reviewed EKG no appreciable changes.

## 2018-01-06 NOTE — DISCHARGE NOTE ADULT - CARE PROVIDER_API CALL
Melissa Mares), Internal Medicine  [address] [place] [address]  Phone: [phone]  Fax: [phone]

## 2018-01-06 NOTE — ED ADULT NURSE REASSESSMENT NOTE - NS ED NURSE REASSESS COMMENT FT1
Patient reports vomiting one time following morphine administration. Patient denies any nausea at this time and requests no antiemetics. Patient is in no acute distress at this time. Will continue to monitor. Awaiting transport to assigned room.

## 2018-01-06 NOTE — DISCHARGE NOTE ADULT - MEDICATION SUMMARY - MEDICATIONS TO CHANGE
I will SWITCH the dose or number of times a day I take the medications listed below when I get home from the hospital:    gabapentin 300 mg oral tablet  -- 2 tab(s) by mouth every 8 hours

## 2018-01-06 NOTE — H&P ADULT - NSHPPHYSICALEXAM_GEN_ALL_CORE
T(C): 36.6 (01-06-18 @ 04:32), Max: 37.3 (01-05-18 @ 17:33)  HR: 65 (01-06-18 @ 04:32) (65 - 102)  BP: 117/78 (01-06-18 @ 04:32) (117/78 - 169/95)  RR: 18 (01-06-18 @ 04:32) (16 - 18)  SpO2: 99% (01-06-18 @ 04:32) (99% - 100%)  Wt(kg): --  GENERAL: NAD, well-developed  HEAD:  Atraumatic, Normocephalic  EYES: EOMI, PERRLA, conjunctiva and sclera clear  NECK: Supple, No JVD  CHEST/LUNG: Clear to auscultation bilaterally; No wheeze  HEART: Regular rate and rhythm; No murmurs, rubs, or gallops  ABDOMEN: Soft, Nontender, Nondistended; Bowel sounds present  EXTREMITIES:  2+ Peripheral Pulses, No clubbing, cyanosis, or edema  PSYCH: AAOx3  NEUROLOGY: 5/5 strength in upper and lower extremities. Lumbar paraspinal tenderness on palpation.  SKIN: No rashes or lesions

## 2018-01-06 NOTE — PROGRESS NOTE ADULT - ASSESSMENT
43yo F w/ Hx of herniated disc presented to ER for worsening lower back pain control after cortisone injection from 1/3 s/p MRI revealing spondylolisthesis and  L5-S1 herniated disc,

## 2018-01-06 NOTE — H&P ADULT - ATTENDING COMMENTS
43yo F w/ Hx of herniated disc presented to ER for lower back pain control. Patient reported lower back pain started ~2-3 months ago. Describes the pain as a sharp stabbing pain, worsening with sitting and standing, most improved when lying down. Recently difficult to function at home. She has tried multiple muscle relaxants outpatient. Has been on gabapentin 600 TID for several weeks and most recently took 900mg prior to going to the ED. Recently had lumbar steroid injection on 1/2. Thereafter had worsening of pain. Confirmed HPI and ROS with patient.   Vitals reviewed and physically examined patient: Agree with resident's findings above.  Labs, imaging and EKG personally reviewed  Spondylolisthesis, L5-S1 herniated disc: discussed with ortho resident, Dr Ruiz. Patient prefers to undergo conservative management deferring surgical intervention at this time. Ortho team agreeable to Percocet, Toradol, and Gabapentin at this time. Morphine IV prn to breakthrough pain. Consider pain consult in AM  Zofran prn for nausea/emesis. PT eval placed  Remainder of plan as above.  Patient previously unknown to me and I was assigned to case. I have precepted this case with housestaff resident, Dr. Webster .Primary day team to assume care in AM.

## 2018-01-06 NOTE — H&P ADULT - PROBLEM SELECTOR PLAN 1
- Likely secondary to L5/S1 herniation. Unchanged on repeated MRI.  - Ortho on consult and recommended conservative management per patient's request.  - Will give Toradol prn severe pain. Low dose morphine for breakthrough pain.  - Zofran prn for n/v. - Likely secondary to L5/S1 herniation. Unchanged on repeated MRI.  - Ortho on consult and recommended conservative management per patient's request.  - Continue Gabapentin in setting of radiculopathy/neuropathic pain  - Will give Toradol prn severe pain. Low dose morphine for breakthrough pain.  - Zofran prn for n/v.  - Consider pain consult in AM

## 2018-01-06 NOTE — PROGRESS NOTE ADULT - PROBLEM SELECTOR PLAN 1
- Likely secondary to L5/S1 herniation. Unchanged on repeat MRI.  - Ortho consulted and recommended conservative management per patient's request.  - Continue Gabapentin 600 TID in setting of radiculopathy/neuropathic pain. would be hesitant to increase dose as pt reporting drowziness when she took 900 TID at home  - Will give Toradol prn severe pain. will d/c Morphine and Dilaudid as it is causing the pt significant N/V  - d/c Zofran. will start Reglan 10 IV q6 prn for n/v.  - t/c pain consult

## 2018-01-06 NOTE — PROGRESS NOTE ADULT - SUBJECTIVE AND OBJECTIVE BOX
Pt seen and examined. States that her pain is improved. She also states that the numbness and tingling in her right leg is a little bit better. States that she prefers to go home today if possible.    Vital Signs Last 24 Hrs  T(C): 36.6 (06 Jan 2018 04:32), Max: 37.3 (05 Jan 2018 17:33)  T(F): 97.9 (06 Jan 2018 04:32), Max: 99.2 (05 Jan 2018 17:33)  HR: 65 (06 Jan 2018 04:32) (65 - 102)  BP: 117/78 (06 Jan 2018 04:32) (117/78 - 169/95)  BP(mean): --  RR: 18 (06 Jan 2018 04:32) (16 - 18)  SpO2: 99% (06 Jan 2018 04:32) (99% - 100%)    AOx4  NAD  Back TTP L5-S1 level  B/L LE 5/5 str IP/H/Q/TA/EHL/GS/FHL  SILT L2-S1 decr sensation R S1>L5, slightly improved vs yesterday  neg clonus, neg babinski  Compartments soft and compressible    42F with isthmic spondylolisthesis, L5-S1 herniated disc   - WBAT  - Pain control  - PT/OOB  - MRI L spine largely unchanged from 12/16  - If patient improved, may follow-up with Dr. Reyes in the office next week [phone]

## 2018-01-06 NOTE — PROGRESS NOTE ADULT - PROBLEM SELECTOR PLAN 2
- Patient has a grossly positive UA, now s/p Ceftriaxone in ED  - pt complaining of suprapubic pain  - will obtain urine culture  - will continue Ceftriaxone

## 2018-01-06 NOTE — H&P ADULT - NSHPREVIEWOFSYSTEMS_GEN_ALL_CORE
REVIEW OF SYSTEMS:    CONSTITUTIONAL: No weakness, fevers or chills  EYES/ENT: No visual changes;  No vertigo or throat pain   NECK: No pain or stiffness  RESPIRATORY: No cough, wheezing, hemoptysis; No shortness of breath  CARDIOVASCULAR: No chest pain or palpitations  GASTROINTESTINAL: No abdominal or epigastric pain. No nausea, vomiting, or hematemesis; No diarrhea or constipation. No melena or hematochezia.  GENITOURINARY: No dysuria, frequency or hematuria  NEUROLOGICAL: Lower back pain, RLE numbness.  SKIN: No itching, burning, rashes, or lesions   All other review of systems is negative unless indicated above.

## 2018-01-06 NOTE — PROGRESS NOTE ADULT - PROBLEM SELECTOR PLAN 3
- DVT ppx: lovenox  - Diet: Regular diet ordered    Nito Mcnulty, PGY-1  Care Model B  Pager [phone]

## 2018-01-06 NOTE — H&P ADULT - PROBLEM SELECTOR PLAN 2
- Patient has UA concerning for bacteria colonization (no pyuria). Patient remains asymptomatic. No need for further abx.

## 2018-01-06 NOTE — H&P ADULT - NSHPSOCIALHISTORY_GEN_ALL_CORE
No smoking, no drug use, no alcohol.   Lives with spouse and 2 younger children (Ages 7 and 8)  No smoking, no drug use, no alcohol.

## 2018-01-06 NOTE — DISCHARGE NOTE ADULT - PLAN OF CARE
While here you were found to have a UTI which was causing your lower abdominal pain. You were treated with IV antibiotics while here, and are being sent home with oral antibiotics for the rest of your antibiotics course. Please continue taking all medications as prescribed. Please follow up with your PCP within 1-2 weeks of discharge for further management. Pain control You came in because of worsening back pain following your steroid injection. You received imaging, which did not show a cause of your pain, besides your herniated disc. While in pain, please do not lift anything greater that 5 lbs. Please continue to take all medications as prescribed. Resolution

## 2018-01-06 NOTE — DISCHARGE NOTE ADULT - PATIENT PORTAL LINK FT
“You can access the [facility] Patient Portal, offered by [facility], by registering with the following website: [email or web address]”

## 2018-01-06 NOTE — H&P ADULT - ASSESSMENT
43yo F w/ Hx of herniated disc presented to ER for lower back pain control after cortisone injection from 1/3. 43yo F w/ Hx of herniated disc presented to ER for worsening lower back pain control after cortisone injection from 1/3 s/p MRI revealing spondylolisthesis and  L5-S1 herniated disc,

## 2018-01-06 NOTE — DISCHARGE NOTE ADULT - MEDICATION SUMMARY - MEDICATIONS TO TAKE
I will START or STAY ON the medications listed below when I get home from the hospital:    gabapentin 300 mg oral capsule  -- 3 cap(s) by mouth 3 times a day   -- Indication: For Low back pain    SMZ-TMP  mg-160 mg oral tablet  -- 2 tab(s) by mouth 2 times a day   -- Avoid prolonged or excessive exposure to direct and/or artificial sunlight while taking this medication.  Finish all this medication unless otherwise directed by prescriber.  Medication should be taken with plenty of water.    -- Indication: For UTI (urinary tract infection)

## 2018-01-07 VITALS
OXYGEN SATURATION: 99 % | SYSTOLIC BLOOD PRESSURE: 105 MMHG | DIASTOLIC BLOOD PRESSURE: 72 MMHG | RESPIRATION RATE: 18 BRPM | TEMPERATURE: 98 F | HEART RATE: 82 BPM

## 2018-01-07 LAB
BASOPHILS # BLD AUTO: 0.1 K/UL — SIGNIFICANT CHANGE UP (ref 0–0.2)
BASOPHILS NFR BLD AUTO: 0.7 % — SIGNIFICANT CHANGE UP (ref 0–2)
CULTURE RESULTS: SIGNIFICANT CHANGE UP
EOSINOPHIL # BLD AUTO: 0.2 K/UL — SIGNIFICANT CHANGE UP (ref 0–0.5)
EOSINOPHIL NFR BLD AUTO: 2.4 % — SIGNIFICANT CHANGE UP (ref 0–6)
HCT VFR BLD CALC: 35.5 % — SIGNIFICANT CHANGE UP (ref 34.5–45)
HGB BLD-MCNC: 11.6 G/DL — SIGNIFICANT CHANGE UP (ref 11.5–15.5)
LYMPHOCYTES # BLD AUTO: 3 K/UL — SIGNIFICANT CHANGE UP (ref 1–3.3)
LYMPHOCYTES # BLD AUTO: 35.9 % — SIGNIFICANT CHANGE UP (ref 13–44)
MCHC RBC-ENTMCNC: 26.3 PG — LOW (ref 27–34)
MCHC RBC-ENTMCNC: 32.8 GM/DL — SIGNIFICANT CHANGE UP (ref 32–36)
MCV RBC AUTO: 80.2 FL — SIGNIFICANT CHANGE UP (ref 80–100)
MONOCYTES # BLD AUTO: 0.9 K/UL — SIGNIFICANT CHANGE UP (ref 0–0.9)
MONOCYTES NFR BLD AUTO: 10.4 % — SIGNIFICANT CHANGE UP (ref 2–14)
NEUTROPHILS # BLD AUTO: 4.3 K/UL — SIGNIFICANT CHANGE UP (ref 1.8–7.4)
NEUTROPHILS NFR BLD AUTO: 50.7 % — SIGNIFICANT CHANGE UP (ref 43–77)
PLATELET # BLD AUTO: 321 K/UL — SIGNIFICANT CHANGE UP (ref 150–400)
RBC # BLD: 4.42 M/UL — SIGNIFICANT CHANGE UP (ref 3.8–5.2)
RBC # FLD: 14.3 % — SIGNIFICANT CHANGE UP (ref 10.3–14.5)
SPECIMEN SOURCE: SIGNIFICANT CHANGE UP
WBC # BLD: 8.4 K/UL — SIGNIFICANT CHANGE UP (ref 3.8–10.5)
WBC # FLD AUTO: 8.4 K/UL — SIGNIFICANT CHANGE UP (ref 3.8–10.5)

## 2018-01-07 PROCEDURE — 86900 BLOOD TYPING SEROLOGIC ABO: CPT

## 2018-01-07 PROCEDURE — 83605 ASSAY OF LACTIC ACID: CPT

## 2018-01-07 PROCEDURE — A9585: CPT

## 2018-01-07 PROCEDURE — 85027 COMPLETE CBC AUTOMATED: CPT

## 2018-01-07 PROCEDURE — 84295 ASSAY OF SERUM SODIUM: CPT

## 2018-01-07 PROCEDURE — 72149 MRI LUMBAR SPINE W/DYE: CPT

## 2018-01-07 PROCEDURE — 83690 ASSAY OF LIPASE: CPT

## 2018-01-07 PROCEDURE — 87086 URINE CULTURE/COLONY COUNT: CPT

## 2018-01-07 PROCEDURE — 81001 URINALYSIS AUTO W/SCOPE: CPT

## 2018-01-07 PROCEDURE — 82330 ASSAY OF CALCIUM: CPT

## 2018-01-07 PROCEDURE — 85014 HEMATOCRIT: CPT

## 2018-01-07 PROCEDURE — 82803 BLOOD GASES ANY COMBINATION: CPT

## 2018-01-07 PROCEDURE — 82947 ASSAY GLUCOSE BLOOD QUANT: CPT

## 2018-01-07 PROCEDURE — 86901 BLOOD TYPING SEROLOGIC RH(D): CPT

## 2018-01-07 PROCEDURE — 96374 THER/PROPH/DIAG INJ IV PUSH: CPT | Mod: XU

## 2018-01-07 PROCEDURE — 85610 PROTHROMBIN TIME: CPT

## 2018-01-07 PROCEDURE — 99239 HOSP IP/OBS DSCHRG MGMT >30: CPT

## 2018-01-07 PROCEDURE — G0378: CPT

## 2018-01-07 PROCEDURE — 86140 C-REACTIVE PROTEIN: CPT

## 2018-01-07 PROCEDURE — 99285 EMERGENCY DEPT VISIT HI MDM: CPT | Mod: 25

## 2018-01-07 PROCEDURE — 84702 CHORIONIC GONADOTROPIN TEST: CPT

## 2018-01-07 PROCEDURE — 86850 RBC ANTIBODY SCREEN: CPT

## 2018-01-07 PROCEDURE — 82435 ASSAY OF BLOOD CHLORIDE: CPT

## 2018-01-07 PROCEDURE — 84132 ASSAY OF SERUM POTASSIUM: CPT

## 2018-01-07 PROCEDURE — 85652 RBC SED RATE AUTOMATED: CPT

## 2018-01-07 PROCEDURE — 85730 THROMBOPLASTIN TIME PARTIAL: CPT

## 2018-01-07 PROCEDURE — 93005 ELECTROCARDIOGRAM TRACING: CPT

## 2018-01-07 PROCEDURE — 71045 X-RAY EXAM CHEST 1 VIEW: CPT

## 2018-01-07 PROCEDURE — 80053 COMPREHEN METABOLIC PANEL: CPT

## 2018-01-07 RX ORDER — GABAPENTIN 400 MG/1
2 CAPSULE ORAL
Qty: 0 | Refills: 0 | COMMUNITY

## 2018-01-07 RX ORDER — AZTREONAM 2 G
2 VIAL (EA) INJECTION
Qty: 8 | Refills: 0
Start: 2018-01-07 | End: 2018-01-08

## 2018-01-07 RX ORDER — GABAPENTIN 400 MG/1
300 CAPSULE ORAL ONCE
Qty: 0 | Refills: 0 | Status: COMPLETED | OUTPATIENT
Start: 2018-01-07 | End: 2018-01-07

## 2018-01-07 RX ORDER — GABAPENTIN 400 MG/1
3 CAPSULE ORAL
Qty: 270 | Refills: 0
Start: 2018-01-07 | End: 2018-02-05

## 2018-01-07 RX ADMIN — GABAPENTIN 600 MILLIGRAM(S): 400 CAPSULE ORAL at 06:06

## 2018-01-07 RX ADMIN — GABAPENTIN 300 MILLIGRAM(S): 400 CAPSULE ORAL at 11:11

## 2018-01-07 NOTE — PROGRESS NOTE ADULT - PROBLEM SELECTOR PLAN 1
- Likely secondary to L5/S1 herniation. Unchanged on repeat MRI.  - Ortho consulted and recommended conservative management per patient's request.  - Continue Gabapentin 600 TID in setting of radiculopathy/neuropathic pain.   - Will give tramadol prn severe pain.   - avoid Morphine and Dilaudid as it causes pt significant N/V  - c/w Reglan 10 IV q6 prn for n/v.  - patient with appointment for spine surgeon outpatient tomorrow

## 2018-01-07 NOTE — PROGRESS NOTE ADULT - PROBLEM SELECTOR PLAN 2
- Patient has a grossly positive UA, now s/p Ceftriaxone in ED  - suprapubic pain resolved today  - f/u urine culture  - will continue Ceftriaxone for now

## 2018-01-07 NOTE — PROGRESS NOTE ADULT - ASSESSMENT
41yo F w/ Hx of herniated disc admitted for management of worsening lower back after cortisone injection from 1/3 s/p MRI revealing spondylolisthesis and  L5-S1 herniated disc, course c/b UTI.

## 2018-01-07 NOTE — PROGRESS NOTE ADULT - ATTENDING COMMENTS
Pt with improvement in her pain this AM with increased dose of gabapentin. She has appt with spine specialist tomorrow. Her  at the bedside in agreement with dc home today. All questions and concerns were addressed.     Amy Restrepo MD  Division of Hospital Medicine  Pager: [phone]  Office: [phone]

## 2018-01-07 NOTE — PROGRESS NOTE ADULT - PROBLEM SELECTOR PLAN 3
- DVT ppx: lovenox  - Diet: Regular diet ordered    Robyn Ellison, PGY3  [facility]  Pager [phone]

## 2018-01-07 NOTE — PROGRESS NOTE ADULT - SUBJECTIVE AND OBJECTIVE BOX
Patient is a 42y old  Female who presents with a chief complaint of lower back pain (2018 16:42)    SUBJECTIVE / OVERNIGHT EVENTS: Patient endorsing improvement of pain overnight. No fevers, chills, nausea, vomiting or constipation.     MEDICATIONS  (STANDING):  cefTRIAXone   IVPB 1 Gram(s) IV Intermittent every 24 hours  enoxaparin Injectable 40 milliGRAM(s) SubCutaneous daily  gabapentin 600 milliGRAM(s) Oral <User Schedule>  gabapentin 900 milliGRAM(s) Oral <User Schedule>  influenza   Vaccine 0.5 milliLiter(s) IntraMuscular once    MEDICATIONS  (PRN):  metoclopramide Injectable 10 milliGRAM(s) IV Push every 6 hours PRN Nausea or Vomiting  traMADol 50 milliGRAM(s) Oral every 6 hours PRN Severe Pain (7 - 10)      T(C): 36.8 (18 @ 05:19), Max: 36.9 (18 @ 13:35)  HR: 74 (18 @ 05:19) (74 - 85)  BP: 100/62 (18 @ 05:19) (100/62 - 125/85)  RR: 18 (18 @ 05:19) (16 - 18)  SpO2: 98% (18 @ 05:19) (97% - 98%)  CAPILLARY BLOOD GLUCOSE        I&O's Summary    2018 07:01  -  2018 07:00  --------------------------------------------------------  IN: 740 mL / OUT: 0 mL / NET: 740 mL        PHYSICAL EXAM:  GENERAL: NAD, well-developed  HEAD:  Atraumatic, Normocephalic  EYES: EOMI, conjunctiva and sclera clear  NECK: Supple  CHEST/LUNG: Clear to auscultation bilaterally; No wheeze  HEART: Regular rate and rhythm; No murmurs, rubs, or gallops  ABDOMEN: Soft, Nontender, Nondistended; Bowel sounds present  EXTREMITIES:  2+ Peripheral Pulses, No clubbing, cyanosis, or edema  PSYCH: AAOx3  NEUROLOGY: non-focal  SKIN: No rashes or lesions    LABS:                        11.6   8.4   )-----------( 321      ( 2018 07:16 )             35.5         138  |  100  |  13  ----------------------------<  101<H>  4.1   |  26  |  0.74    Ca    9.5      2018 18:42    TPro  7.8  /  Alb  4.4  /  TBili  0.2  /  DBili  x   /  AST  14  /  ALT  14  /  AlkPhos  81  -    PT/INR - ( 2018 19:38 )   PT: 10.9 sec;   INR: 1.01 ratio         PTT - ( 2018 19:38 )  PTT:29.6 sec      Urinalysis Basic - ( 2018 19:38 )    Color: PL Yellow / Appearance: SL Turbid / S.011 / pH: x  Gluc: x / Ketone: Negative  / Bili: Negative / Urobili: Negative   Blood: x / Protein: Negative / Nitrite: Negative   Leuk Esterase: Moderate / RBC: x / WBC 5-10 /HPF   Sq Epi: x / Non Sq Epi: Many /HPF / Bacteria: Many /HPF    RADIOLOGY & ADDITIONAL TESTS:    Imaging Personally Reviewed:    Consultant(s) Notes Reviewed:  orthopedics

## 2018-01-26 ENCOUNTER — OUTPATIENT (OUTPATIENT)
Dept: OUTPATIENT SERVICES | Facility: HOSPITAL | Age: 43
LOS: 1 days | End: 2018-01-26
Payer: COMMERCIAL

## 2018-01-26 VITALS
HEART RATE: 85 BPM | HEIGHT: 64 IN | WEIGHT: 160.06 LBS | SYSTOLIC BLOOD PRESSURE: 141 MMHG | OXYGEN SATURATION: 99 % | RESPIRATION RATE: 20 BRPM | TEMPERATURE: 98 F | DIASTOLIC BLOOD PRESSURE: 87 MMHG

## 2018-01-26 DIAGNOSIS — M51.27 OTHER INTERVERTEBRAL DISC DISPLACEMENT, LUMBOSACRAL REGION: ICD-10-CM

## 2018-01-26 DIAGNOSIS — M43.17 SPONDYLOLISTHESIS, LUMBOSACRAL REGION: ICD-10-CM

## 2018-01-26 DIAGNOSIS — K08.499 PARTIAL LOSS OF TEETH DUE TO OTHER SPECIFIED CAUSE, UNSPECIFIED CLASS: Chronic | ICD-10-CM

## 2018-01-26 DIAGNOSIS — Z01.818 ENCOUNTER FOR OTHER PREPROCEDURAL EXAMINATION: ICD-10-CM

## 2018-01-26 DIAGNOSIS — M43.16 SPONDYLOLISTHESIS, LUMBAR REGION: ICD-10-CM

## 2018-01-26 DIAGNOSIS — M54.5 LOW BACK PAIN: ICD-10-CM

## 2018-01-26 DIAGNOSIS — M54.16 RADICULOPATHY, LUMBAR REGION: ICD-10-CM

## 2018-01-26 PROCEDURE — G0463: CPT

## 2018-01-26 RX ORDER — SODIUM CHLORIDE 9 MG/ML
3 INJECTION INTRAMUSCULAR; INTRAVENOUS; SUBCUTANEOUS EVERY 8 HOURS
Qty: 0 | Refills: 0 | Status: DISCONTINUED | OUTPATIENT
Start: 2018-01-30 | End: 2018-01-30

## 2018-01-26 RX ORDER — LIDOCAINE HCL 20 MG/ML
0.2 VIAL (ML) INJECTION ONCE
Qty: 0 | Refills: 0 | Status: DISCONTINUED | OUTPATIENT
Start: 2018-01-30 | End: 2018-01-30

## 2018-01-26 RX ORDER — CEFAZOLIN SODIUM 1 G
2000 VIAL (EA) INJECTION ONCE
Qty: 0 | Refills: 0 | Status: DISCONTINUED | OUTPATIENT
Start: 2018-01-30 | End: 2018-02-14

## 2018-01-26 NOTE — H&P PST ADULT - HISTORY OF PRESENT ILLNESS
42 year old female with h/o spondylolisthesis, lumbar region is scheduled for right L5-S1 microdiscectomy.

## 2018-01-26 NOTE — H&P PST ADULT - ATTENDING COMMENTS
Pt seen and examined. No changes in health since exam in office. Pt with continued debilitating LBP and RLE radiculopathy, weakness despite meds, PT, alternative therapies, and multiple LEON. R/B/A/C of operative tx reviewed once again including but not limited to infection, blood loss/hematoma, dural leak/dural tear, need for additional operations/revisions, persistent pain, numbness, parasthesias, weakness, as well as medical/anesthetic risks including MI, VTE, PNA, UTI, blindness, multisystem organ failure and death. Pt agrees to proceed and signed fully informed consent for Right L5-S1 microdiscectomy.  No guarantees given or implied.

## 2018-01-30 ENCOUNTER — RESULT REVIEW (OUTPATIENT)
Age: 43
End: 2018-01-30

## 2018-01-30 ENCOUNTER — OUTPATIENT (OUTPATIENT)
Dept: OUTPATIENT SERVICES | Facility: HOSPITAL | Age: 43
LOS: 1 days | End: 2018-01-30
Payer: COMMERCIAL

## 2018-01-30 ENCOUNTER — TRANSCRIPTION ENCOUNTER (OUTPATIENT)
Age: 43
End: 2018-01-30

## 2018-01-30 VITALS
OXYGEN SATURATION: 100 % | DIASTOLIC BLOOD PRESSURE: 82 MMHG | TEMPERATURE: 98 F | HEIGHT: 64 IN | WEIGHT: 160.06 LBS | RESPIRATION RATE: 18 BRPM | SYSTOLIC BLOOD PRESSURE: 131 MMHG | HEART RATE: 101 BPM

## 2018-01-30 VITALS
SYSTOLIC BLOOD PRESSURE: 127 MMHG | DIASTOLIC BLOOD PRESSURE: 79 MMHG | HEART RATE: 97 BPM | RESPIRATION RATE: 17 BRPM | OXYGEN SATURATION: 98 %

## 2018-01-30 DIAGNOSIS — M54.16 RADICULOPATHY, LUMBAR REGION: ICD-10-CM

## 2018-01-30 DIAGNOSIS — M54.5 LOW BACK PAIN: ICD-10-CM

## 2018-01-30 DIAGNOSIS — M51.27 OTHER INTERVERTEBRAL DISC DISPLACEMENT, LUMBOSACRAL REGION: ICD-10-CM

## 2018-01-30 DIAGNOSIS — M43.16 SPONDYLOLISTHESIS, LUMBAR REGION: ICD-10-CM

## 2018-01-30 DIAGNOSIS — K08.499 PARTIAL LOSS OF TEETH DUE TO OTHER SPECIFIED CAUSE, UNSPECIFIED CLASS: Chronic | ICD-10-CM

## 2018-01-30 LAB — HCG UR QL: NEGATIVE — SIGNIFICANT CHANGE UP

## 2018-01-30 PROCEDURE — C1889: CPT

## 2018-01-30 PROCEDURE — 63030 LAMOT DCMPRN NRV RT 1 LMBR: CPT

## 2018-01-30 PROCEDURE — 88304 TISSUE EXAM BY PATHOLOGIST: CPT | Mod: 26

## 2018-01-30 PROCEDURE — 88304 TISSUE EXAM BY PATHOLOGIST: CPT

## 2018-01-30 PROCEDURE — 88311 DECALCIFY TISSUE: CPT

## 2018-01-30 PROCEDURE — 76000 FLUOROSCOPY <1 HR PHYS/QHP: CPT

## 2018-01-30 PROCEDURE — 88311 DECALCIFY TISSUE: CPT | Mod: 26

## 2018-01-30 PROCEDURE — 81025 URINE PREGNANCY TEST: CPT

## 2018-01-30 RX ORDER — METOCLOPRAMIDE HCL 10 MG
10 TABLET ORAL ONCE
Qty: 0 | Refills: 0 | Status: COMPLETED | OUTPATIENT
Start: 2018-01-30 | End: 2018-01-30

## 2018-01-30 RX ORDER — DIAZEPAM 5 MG
1 TABLET ORAL
Qty: 30 | Refills: 0
Start: 2018-01-30

## 2018-01-30 RX ORDER — OXYCODONE HYDROCHLORIDE 5 MG/1
10 TABLET ORAL ONCE
Qty: 0 | Refills: 0 | Status: DISCONTINUED | OUTPATIENT
Start: 2018-01-30 | End: 2018-01-30

## 2018-01-30 RX ORDER — DEXAMETHASONE 0.5 MG/5ML
4 ELIXIR ORAL ONCE
Qty: 0 | Refills: 0 | Status: COMPLETED | OUTPATIENT
Start: 2018-01-30 | End: 2018-01-30

## 2018-01-30 RX ORDER — ONDANSETRON 8 MG/1
8 TABLET, FILM COATED ORAL ONCE
Qty: 0 | Refills: 0 | Status: DISCONTINUED | OUTPATIENT
Start: 2018-01-30 | End: 2018-02-14

## 2018-01-30 RX ORDER — ONDANSETRON 8 MG/1
4 TABLET, FILM COATED ORAL ONCE
Qty: 0 | Refills: 0 | Status: COMPLETED | OUTPATIENT
Start: 2018-01-30 | End: 2018-01-30

## 2018-01-30 RX ORDER — OXYCODONE AND ACETAMINOPHEN 5; 325 MG/1; MG/1
1 TABLET ORAL EVERY 6 HOURS
Qty: 0 | Refills: 0 | Status: DISCONTINUED | OUTPATIENT
Start: 2018-01-30 | End: 2018-01-30

## 2018-01-30 RX ORDER — ONDANSETRON 8 MG/1
4 TABLET, FILM COATED ORAL EVERY 6 HOURS
Qty: 0 | Refills: 0 | Status: DISCONTINUED | OUTPATIENT
Start: 2018-01-30 | End: 2018-02-14

## 2018-01-30 RX ORDER — DIPHENHYDRAMINE HCL 50 MG
12.5 CAPSULE ORAL ONCE
Qty: 0 | Refills: 0 | Status: DISCONTINUED | OUTPATIENT
Start: 2018-01-30 | End: 2018-02-14

## 2018-01-30 RX ORDER — ONDANSETRON 8 MG/1
1 TABLET, FILM COATED ORAL
Qty: 30 | Refills: 0
Start: 2018-01-30

## 2018-01-30 RX ORDER — FAMOTIDINE 10 MG/ML
20 INJECTION INTRAVENOUS ONCE
Qty: 0 | Refills: 0 | Status: COMPLETED | OUTPATIENT
Start: 2018-01-30 | End: 2018-01-30

## 2018-01-30 RX ORDER — HYDROMORPHONE HYDROCHLORIDE 2 MG/ML
0.5 INJECTION INTRAMUSCULAR; INTRAVENOUS; SUBCUTANEOUS
Qty: 0 | Refills: 0 | Status: DISCONTINUED | OUTPATIENT
Start: 2018-01-30 | End: 2018-01-30

## 2018-01-30 RX ADMIN — Medication 10 MILLIGRAM(S): at 14:59

## 2018-01-30 RX ADMIN — Medication 4 MILLIGRAM(S): at 12:57

## 2018-01-30 RX ADMIN — FAMOTIDINE 20 MILLIGRAM(S): 10 INJECTION INTRAVENOUS at 14:30

## 2018-01-30 RX ADMIN — OXYCODONE HYDROCHLORIDE 10 MILLIGRAM(S): 5 TABLET ORAL at 08:21

## 2018-01-30 RX ADMIN — ONDANSETRON 4 MILLIGRAM(S): 8 TABLET, FILM COATED ORAL at 12:17

## 2018-01-30 RX ADMIN — HYDROMORPHONE HYDROCHLORIDE 0.5 MILLIGRAM(S): 2 INJECTION INTRAMUSCULAR; INTRAVENOUS; SUBCUTANEOUS at 13:07

## 2018-01-30 RX ADMIN — HYDROMORPHONE HYDROCHLORIDE 0.5 MILLIGRAM(S): 2 INJECTION INTRAMUSCULAR; INTRAVENOUS; SUBCUTANEOUS at 12:58

## 2018-01-30 RX ADMIN — ONDANSETRON 4 MILLIGRAM(S): 8 TABLET, FILM COATED ORAL at 14:30

## 2018-01-30 NOTE — ASU DISCHARGE PLAN (ADULT/PEDIATRIC). - MEDICATION SUMMARY - MEDICATIONS TO TAKE
I will START or STAY ON the medications listed below when I get home from the hospital:    oxyCODONE-acetaminophen 5 mg-325 mg oral tablet  -- 1 tab(s) by mouth every 6 hours, As needed, Moderate Pain (4 - 6) MDD:4 tablets  -- Indication: For Low back pain    gabapentin 300 mg oral capsule  -- 3 cap(s) by mouth 3 times a day   -- Indication: For Low back pain    diazePAM 5 mg oral tablet  -- 1 tab(s) by mouth every 8 hours, As needed, musce spasm MDD:3  -- Indication: For Low back pain    ondansetron 4 mg oral tablet  -- 1 tab(s) by mouth every 6 hours, As needed, Nausea and/or Vomiting  -- Indication: For Nausea

## 2018-01-30 NOTE — ASU DISCHARGE PLAN (ADULT/PEDIATRIC). - NOTIFY
Unable to Urinate/Fever greater than 101/Persistent Nausea and Vomiting/Pain not relieved by Medications/Numbness, tingling/Numbness, color, or temperature change to extremity

## 2018-01-30 NOTE — ASU DISCHARGE PLAN (ADULT/PEDIATRIC). - ITEMS TO FOLLOWUP WITH YOUR PHYSICIAN'S
Please follow up with Dr. Reyes in office at [address] [facility] [address], [place] [address].   Please call [phone] for appointment.

## 2018-02-12 LAB — SURGICAL PATHOLOGY STUDY: SIGNIFICANT CHANGE UP

## 2018-05-21 ENCOUNTER — MEDICATION RENEWAL (OUTPATIENT)
Age: 43
End: 2018-05-21

## 2018-06-22 ENCOUNTER — EMERGENCY (EMERGENCY)
Facility: HOSPITAL | Age: 43
LOS: 1 days | Discharge: HOME | End: 2018-06-22
Attending: EMERGENCY MEDICINE
Payer: COMMERCIAL

## 2018-06-22 VITALS
HEART RATE: 106 BPM | DIASTOLIC BLOOD PRESSURE: 101 MMHG | RESPIRATION RATE: 17 BRPM | HEIGHT: 64 IN | OXYGEN SATURATION: 100 % | SYSTOLIC BLOOD PRESSURE: 181 MMHG | TEMPERATURE: 99 F | WEIGHT: 154.98 LBS

## 2018-06-22 VITALS
DIASTOLIC BLOOD PRESSURE: 91 MMHG | RESPIRATION RATE: 18 BRPM | SYSTOLIC BLOOD PRESSURE: 156 MMHG | OXYGEN SATURATION: 100 % | HEART RATE: 98 BPM

## 2018-06-22 DIAGNOSIS — K08.499 PARTIAL LOSS OF TEETH DUE TO OTHER SPECIFIED CAUSE, UNSPECIFIED CLASS: Chronic | ICD-10-CM

## 2018-06-22 LAB
ALBUMIN SERPL ELPH-MCNC: 4.5 G/DL — SIGNIFICANT CHANGE UP (ref 3.3–5)
ALP SERPL-CCNC: 87 U/L — SIGNIFICANT CHANGE UP (ref 40–120)
ALT FLD-CCNC: 12 U/L — SIGNIFICANT CHANGE UP (ref 10–45)
ANION GAP SERPL CALC-SCNC: 12 MMOL/L — SIGNIFICANT CHANGE UP (ref 5–17)
AST SERPL-CCNC: 11 U/L — SIGNIFICANT CHANGE UP (ref 10–40)
BASOPHILS # BLD AUTO: 0.1 K/UL — SIGNIFICANT CHANGE UP (ref 0–0.2)
BASOPHILS NFR BLD AUTO: 0.7 % — SIGNIFICANT CHANGE UP (ref 0–2)
BILIRUB SERPL-MCNC: 0.1 MG/DL — LOW (ref 0.2–1.2)
BUN SERPL-MCNC: 16 MG/DL — SIGNIFICANT CHANGE UP (ref 7–23)
CA-I BLD-SCNC: 1.27 MMOL/L — SIGNIFICANT CHANGE UP (ref 1.12–1.3)
CALCIUM SERPL-MCNC: 9.4 MG/DL — SIGNIFICANT CHANGE UP (ref 8.4–10.5)
CHLORIDE SERPL-SCNC: 103 MMOL/L — SIGNIFICANT CHANGE UP (ref 96–108)
CO2 SERPL-SCNC: 24 MMOL/L — SIGNIFICANT CHANGE UP (ref 22–31)
CREAT SERPL-MCNC: 0.82 MG/DL — SIGNIFICANT CHANGE UP (ref 0.5–1.3)
EOSINOPHIL # BLD AUTO: 0 K/UL — SIGNIFICANT CHANGE UP (ref 0–0.5)
EOSINOPHIL NFR BLD AUTO: 0.4 % — SIGNIFICANT CHANGE UP (ref 0–6)
GLUCOSE SERPL-MCNC: 124 MG/DL — HIGH (ref 70–99)
HCT VFR BLD CALC: 36.5 % — SIGNIFICANT CHANGE UP (ref 34.5–45)
HGB BLD-MCNC: 11.9 G/DL — SIGNIFICANT CHANGE UP (ref 11.5–15.5)
LYMPHOCYTES # BLD AUTO: 1.3 K/UL — SIGNIFICANT CHANGE UP (ref 1–3.3)
LYMPHOCYTES # BLD AUTO: 14.5 % — SIGNIFICANT CHANGE UP (ref 13–44)
MAGNESIUM SERPL-MCNC: 2 MG/DL — SIGNIFICANT CHANGE UP (ref 1.6–2.6)
MCHC RBC-ENTMCNC: 25.3 PG — LOW (ref 27–34)
MCHC RBC-ENTMCNC: 32.5 GM/DL — SIGNIFICANT CHANGE UP (ref 32–36)
MCV RBC AUTO: 77.8 FL — LOW (ref 80–100)
MONOCYTES # BLD AUTO: 0.5 K/UL — SIGNIFICANT CHANGE UP (ref 0–0.9)
MONOCYTES NFR BLD AUTO: 5.4 % — SIGNIFICANT CHANGE UP (ref 2–14)
NEUTROPHILS # BLD AUTO: 7.2 K/UL — SIGNIFICANT CHANGE UP (ref 1.8–7.4)
NEUTROPHILS NFR BLD AUTO: 79 % — HIGH (ref 43–77)
PHOSPHATE SERPL-MCNC: 2 MG/DL — LOW (ref 2.5–4.5)
PLATELET # BLD AUTO: 366 K/UL — SIGNIFICANT CHANGE UP (ref 150–400)
POTASSIUM SERPL-MCNC: 3.8 MMOL/L — SIGNIFICANT CHANGE UP (ref 3.5–5.3)
POTASSIUM SERPL-SCNC: 3.8 MMOL/L — SIGNIFICANT CHANGE UP (ref 3.5–5.3)
PROT SERPL-MCNC: 7.9 G/DL — SIGNIFICANT CHANGE UP (ref 6–8.3)
RBC # BLD: 4.7 M/UL — SIGNIFICANT CHANGE UP (ref 3.8–5.2)
RBC # FLD: 14.8 % — HIGH (ref 10.3–14.5)
SODIUM SERPL-SCNC: 139 MMOL/L — SIGNIFICANT CHANGE UP (ref 135–145)
WBC # BLD: 9.2 K/UL — SIGNIFICANT CHANGE UP (ref 3.8–10.5)
WBC # FLD AUTO: 9.2 K/UL — SIGNIFICANT CHANGE UP (ref 3.8–10.5)

## 2018-06-22 PROCEDURE — 85027 COMPLETE CBC AUTOMATED: CPT

## 2018-06-22 PROCEDURE — 83735 ASSAY OF MAGNESIUM: CPT

## 2018-06-22 PROCEDURE — 99284 EMERGENCY DEPT VISIT MOD MDM: CPT

## 2018-06-22 PROCEDURE — 70450 CT HEAD/BRAIN W/O DYE: CPT

## 2018-06-22 PROCEDURE — 82330 ASSAY OF CALCIUM: CPT

## 2018-06-22 PROCEDURE — 84100 ASSAY OF PHOSPHORUS: CPT

## 2018-06-22 PROCEDURE — 70450 CT HEAD/BRAIN W/O DYE: CPT | Mod: 26

## 2018-06-22 PROCEDURE — 99284 EMERGENCY DEPT VISIT MOD MDM: CPT | Mod: 25

## 2018-06-22 PROCEDURE — 80053 COMPREHEN METABOLIC PANEL: CPT

## 2018-06-22 NOTE — ED PROVIDER NOTE - OBJECTIVE STATEMENT
Abdomen soft, nontender, nondistended, bowel sounds present in all 4 quadrants. 42F with PMHx of spondylolisthesis (L5-S1 level), PSHx of micro diskectomy (lower lumbar - Jan 30th, 2018), R sided leg symptoms prior to surgery mildly persistent s/p diskectomy. Pt presents to the ED with complaints of constant BL body numbness, intermittently tingling, pins and needles, more on the R than L, sometimes in her mouth, and weakness x1 week, associated with lightheadedness. Notes falling down the stairs x1 week ago, denies head injury or LOC, but notes lower back pain s/p fall and onset of numbness symptoms and upper thoracic back and chest wall pain. Denies slurred speech, blurred vision, or any other complaints at this time. Pt can no longer see her surgeon due to change in insurance.   Dr. Jorge Reyes (surg) 42F with PMHx of spondylolisthesis (L5-S1 level), PSHx of micro diskectomy (lower lumbar - Jan 30th, 2018), R sided leg symptoms prior to surgery mildly persistent s/p diskectomy. Pt presents to the ED with complaints of constant BL body numbness, intermittently tingling, pins and needles, more on the R than L, sometimes in her mouth, x1 week, associated with dizziness and lightheadedness. Notes falling down the stairs x1 week ago, denies head injury or LOC, but notes lower back pain s/p fall and onset of numbness symptoms and upper thoracic back and chest wall pain. Denies slurred speech, blurred vision, or any other complaints at this time. Pt can no longer see her surgeon due to change in insurance.   Dr. Jorge Reyes (surg)

## 2018-06-22 NOTE — ED PROVIDER NOTE - PHYSICAL EXAMINATION
Gen: WNWD NAD  HEENT: NCAT PERRL EOMI normal pharynx  Neck: supple  CV: RRR, no murmur  Lung: CTA BL  Abd: +BS soft NTND  Ext: wwp, palp pulses, FROMx4, no cce  Neuro: CN grossly intact, sensation intact, motor 5/5 throughout Gen: WNWD NAD  HEENT: NCAT PERRL EOMI normal pharynx  Neck: supple  CV: RRR, no murmur  Lung: CTA BL  Abd: +BS soft NTND  Ext: wwp, palp pulses, FROMx4, no cce  Neuro: CN grossly intact, sensation intact however reports decrease sensation to entire R side of body, face, upper ext, trunk and lower ext, motor 5/5 throughout

## 2018-06-22 NOTE — ED ADULT TRIAGE NOTE - CHIEF COMPLAINT QUOTE
R sided body numbness from head to feet x 2 days, Pt had micro diskectomy of the lower lumbar Jan 30, 1018. Recently fell down stairs 1 week ago. Denies urine and bowel incontinence

## 2018-06-22 NOTE — ED PROVIDER NOTE - MEDICAL DECISION MAKING DETAILS
42F with Hx of lumbar degenerative disc disease, here x1 week s/p fall down stairs, complaining of diffuse paresthesias to entire face and body, R greater than L, with associated dizziness and lightheadedness. Do not suspect related to disc herniation as Sx are not limited to one dermatome or peripheral nerve distribution. Will obtain labs, electrolytes, CT head to r//o central cause, but will most likely require outpatient workup. 42F with Hx of lumbar degenerative disc disease, here x1 week s/p fall down stairs, complaining of diffuse paresthesias to entire face, body, and extremities, R greater than L, with associated dizziness and lightheadedness. Do not suspect related to disc herniation as Sx are not limited to one dermatome or peripheral nerve distribution. Possible post concussive syndrome give recent fall. Will obtain labs, electrolytes, CT head to r/o central cause with sx of 1 week duration, neuro exam is otherwise non-focal so do not suspect TIA/CVA. Will most likely require outpatient neuro workup.

## 2018-06-22 NOTE — ED ADULT NURSE NOTE - OBJECTIVE STATEMENT
42 y.o female pmh lower lumbar surgery in January 2018 presenting to ED c/o body numbness from head to toe s/p fall down stairs 1 week ago. pt states she slipped and fell down a flight of steps sliding down all the steps on her buttocks and landing on her lower back and buttocks upon landing. denies any head injury or loc. states she only had buttock pain s/p the fall but began having numbness to her extremities that she now describes as "feeling numb from her head to her toes", more on the right side than the left. denies any tingling, no dizziness, blurred vision, weakness, or change in her bowel or bladder function. VS stable. safety maintained, family at the bedside.

## 2018-06-22 NOTE — ED PROVIDER NOTE - CHPI ED SYMPTOMS POS
Use your home medications and contact your OB provider if still having difficulty  Eating Tips for Morning Sickness   Hyperemesis Gravidarum  During pregnancy, you need to eat enough food to meet your needs and the needs of your baby. Severe nausea and vomiting (hyperemesis) may lead to weight loss and dehydration (too little fluid in the body).   Follow these tips to help control nausea.   1. Eat 6 to 8 small meals in a day, about two hours apart.  2. Before rising in the morning, eat a small amount of dry food. Choose from the list below.    soda crackers (saltines)    dry toast with jelly    breadsticks    dry cereal    rice cakes    pretzels    plain potatoes, rice or noodles    plain low-fat cookies or cake  3. Avoid liquids with meals. Drink liquids 30 to 60 minutes before or after eating. Sip slowly.  4. Foods and drinks should be cool or at room temperature. Try:    flavored gelatin    sherbet, sorbet or Popsicles    carbonated (fizzy) drinks    ice cubes made from juice.  5. Avoid hot drinks and foods.  6. Avoid drinks with caffeine (coffee, tea, cola drinks). They may increase stomach acid.  7. Avoid very sweet, hot or spicy foods.  8. Avoid high-fat foods such as butter, margarine, mayonnaise, wiley, gravies, pie crust, pastries and fried foods. They take longer to leave the stomach.  9. Avoid strong food odors such as fish, cabbage or broccoli. Avoid cooking odors by eating food you do not have to cook.  10. Do not lie down after eating. Rest sitting up for an hour after meals.  11. Take your prenatal vitamins with food in the evening. Tell your doctor if you cannot take them.  12. Nausea is often gone by midday. You may eat more food in the late afternoon, supper and mid-evening. Find the times best for you.  Keep a food diary to help you find foods that you can eat without problems. Try any food that appeals to you.  Menu Planning Guidelines     [employer]. Reprinted with permission.  Food groups Foods  recommended  Foods that may cause distress    Soups Low-fat broth-based and cream soups made with allowed foods. All other soups.    Meats and substitutes  (Six or more ounces daily) All lean, tender meats, poultry or fish. All should be baked, broiled or boiled. Boiled egg. Low-fat or fat-free cheeses. Fried meat, poultry or fish; highly seasoned, cured or smoked meat, poultry or fish (i.e., corned beef, luncheon meat, frankfurters, sausages, sardines, anchovies, wiley and strong flavored cheese). Peanut butter.    Fruits (Two or more servings daily; include a vitamin C source daily) Fruit juices, canned fruits, grapefruit and orange sections (without membrane). Other fresh and dried fruits, if tolerated.     Vegetables  (Three or more servings daily) Vegetable juices, cooked vegetables (i.e., asparagus, green or wax beans, beets, carrots, peas, pumpkin, winter squash, spinach and mushrooms). Raw vegetables if tolerated.  Gas-forming vegetables (i.e., dried peas and beans, corn, broccoli, onions, cauliflower, [place] sprouts, cucumbers, cabbage, turnip, rutabagas, sauerkraut, green peppers).    Bread, cereal, potato, pasta and grains  (Six or more servings daily) Enriched breads and cereals, plain crackers, potatoes, enriched rice, barley, noodles, spaghetti, macaroni and other pastas. Very coarse cereals such as bran; seeds in or on breads, rolls and crackers; breads made with nuts or dried fruits; fried breads and pastries such as doughnuts; fried potatoes, fried rice, wild rice, seasoned rice and pasta mixes.    Dessert fats Low-fat versions of cakes, cookies, custard, pudding, ice cream, frozen yogurt sherbet; ice pops, gelatin, frozen fruit bars, sorbet. Desserts containing salad dressings, nuts, coconut; high-fat desserts.    Milk and milk products (Four or more cups daily) Fat-free and low-fat milk products. Whole milk, cream.    Beverages   (Four or more cups daily) Water, decaffeinated coffee and tea,  PAIN/+numbness, +lightheadedness, +weakness fruit drinks, caffeine-free carbonated beverages, weak tea, lemonade, sports drinks. All caffeine-containing beverages (i.e., coffee, strong tea, cocoa, cola); alcoholic beverages.    Condiments and sweets Iodized salt, flavorings, low-fat gravies and sauces, herbs and spices as tolerated; sugar, syrup, honey, jelly, seedless jam, hard candies and marshmallows. Strongly flavored seasonings and condiments (i.e., catsup, pepper, barbecue sauce, chili sauce, chili pepper, horseradish, garlic, mustard and vinegar), olives, pickles, nuts, chocolate candy.    For informational purposes only. Not to replace the advice of your health care provider.   Copyright   2006 [facility]. All rights reserved. [employer] [id number] - REV 09/15.     +numbness, +lightheadedness/DIZZINESS/PAIN

## 2018-06-25 ENCOUNTER — APPOINTMENT (OUTPATIENT)
Dept: ORTHOPEDIC SURGERY | Facility: CLINIC | Age: 43
End: 2018-06-25
Payer: COMMERCIAL

## 2018-06-25 VITALS
WEIGHT: 155 LBS | HEIGHT: 64 IN | BODY MASS INDEX: 26.46 KG/M2 | SYSTOLIC BLOOD PRESSURE: 133 MMHG | DIASTOLIC BLOOD PRESSURE: 92 MMHG | HEART RATE: 108 BPM

## 2018-06-25 PROCEDURE — 99214 OFFICE O/P EST MOD 30 MIN: CPT

## 2018-06-25 RX ORDER — CYCLOBENZAPRINE HYDROCHLORIDE 10 MG/1
10 TABLET, FILM COATED ORAL
Qty: 15 | Refills: 0 | Status: DISCONTINUED | COMMUNITY
Start: 2017-11-06

## 2018-06-25 RX ORDER — NABUMETONE 500 MG/1
500 TABLET, FILM COATED ORAL
Qty: 14 | Refills: 0 | Status: DISCONTINUED | COMMUNITY
Start: 2017-11-06

## 2018-07-03 ENCOUNTER — APPOINTMENT (OUTPATIENT)
Dept: NEUROLOGY | Facility: CLINIC | Age: 43
End: 2018-07-03
Payer: COMMERCIAL

## 2018-07-03 ENCOUNTER — OTHER (OUTPATIENT)
Age: 43
End: 2018-07-03

## 2018-07-03 VITALS
BODY MASS INDEX: 27.31 KG/M2 | DIASTOLIC BLOOD PRESSURE: 82 MMHG | WEIGHT: 160 LBS | HEIGHT: 64 IN | SYSTOLIC BLOOD PRESSURE: 128 MMHG | HEART RATE: 90 BPM

## 2018-07-03 PROCEDURE — 99205 OFFICE O/P NEW HI 60 MIN: CPT

## 2018-07-06 ENCOUNTER — OTHER (OUTPATIENT)
Age: 43
End: 2018-07-06

## 2018-07-09 ENCOUNTER — APPOINTMENT (OUTPATIENT)
Dept: MRI IMAGING | Facility: CLINIC | Age: 43
End: 2018-07-09
Payer: COMMERCIAL

## 2018-07-09 ENCOUNTER — OUTPATIENT (OUTPATIENT)
Dept: OUTPATIENT SERVICES | Facility: HOSPITAL | Age: 43
LOS: 1 days | End: 2018-07-09
Payer: COMMERCIAL

## 2018-07-09 DIAGNOSIS — R41.82 ALTERED MENTAL STATUS, UNSPECIFIED: ICD-10-CM

## 2018-07-09 DIAGNOSIS — K08.499 PARTIAL LOSS OF TEETH DUE TO OTHER SPECIFIED CAUSE, UNSPECIFIED CLASS: Chronic | ICD-10-CM

## 2018-07-09 DIAGNOSIS — R20.0 ANESTHESIA OF SKIN: ICD-10-CM

## 2018-07-09 PROCEDURE — 70551 MRI BRAIN STEM W/O DYE: CPT | Mod: 26

## 2018-07-09 PROCEDURE — 70551 MRI BRAIN STEM W/O DYE: CPT

## 2018-07-09 PROCEDURE — 72141 MRI NECK SPINE W/O DYE: CPT | Mod: 26

## 2018-07-09 PROCEDURE — 72141 MRI NECK SPINE W/O DYE: CPT

## 2018-11-02 ENCOUNTER — APPOINTMENT (OUTPATIENT)
Dept: OBGYN | Facility: CLINIC | Age: 43
End: 2018-11-02
Payer: COMMERCIAL

## 2018-11-02 VITALS
SYSTOLIC BLOOD PRESSURE: 144 MMHG | BODY MASS INDEX: 28.51 KG/M2 | DIASTOLIC BLOOD PRESSURE: 80 MMHG | HEIGHT: 64 IN | WEIGHT: 167 LBS

## 2018-11-02 DIAGNOSIS — Z34.90 ENCOUNTER FOR SUPERVISION OF NORMAL PREGNANCY, UNSPECIFIED, UNSPECIFIED TRIMESTER: ICD-10-CM

## 2018-11-02 DIAGNOSIS — Z87.39 PERSONAL HISTORY OF OTHER DISEASES OF THE MUSCULOSKELETAL SYSTEM AND CONNECTIVE TISSUE: ICD-10-CM

## 2018-11-02 DIAGNOSIS — M43.17 SPONDYLOLISTHESIS, LUMBOSACRAL REGION: ICD-10-CM

## 2018-11-02 DIAGNOSIS — R20.0 ANESTHESIA OF SKIN: ICD-10-CM

## 2018-11-02 DIAGNOSIS — M51.26 OTHER INTERVERTEBRAL DISC DISPLACEMENT, LUMBAR REGION: ICD-10-CM

## 2018-11-02 DIAGNOSIS — M43.06 SPONDYLOLYSIS, LUMBAR REGION: ICD-10-CM

## 2018-11-02 DIAGNOSIS — R20.2 ANESTHESIA OF SKIN: ICD-10-CM

## 2018-11-02 PROCEDURE — 99396 PREV VISIT EST AGE 40-64: CPT

## 2018-11-02 RX ORDER — OMEPRAZOLE 20 MG/1
20 CAPSULE, DELAYED RELEASE ORAL DAILY
Qty: 30 | Refills: 1 | Status: COMPLETED | COMMUNITY
Start: 2017-11-13 | End: 2018-11-02

## 2018-11-02 RX ORDER — PREDNISONE 20 MG/1
20 TABLET ORAL
Qty: 26 | Refills: 0 | Status: COMPLETED | COMMUNITY
Start: 2017-12-01 | End: 2018-11-02

## 2018-11-02 RX ORDER — IBUPROFEN 800 MG/1
800 TABLET, FILM COATED ORAL
Qty: 90 | Refills: 0 | Status: COMPLETED | COMMUNITY
Start: 2017-11-13 | End: 2018-11-02

## 2018-11-04 ENCOUNTER — FORM ENCOUNTER (OUTPATIENT)
Age: 43
End: 2018-11-04

## 2018-11-05 ENCOUNTER — APPOINTMENT (OUTPATIENT)
Dept: MAMMOGRAPHY | Facility: IMAGING CENTER | Age: 43
End: 2018-11-05
Payer: COMMERCIAL

## 2018-11-05 ENCOUNTER — OUTPATIENT (OUTPATIENT)
Dept: OUTPATIENT SERVICES | Facility: HOSPITAL | Age: 43
LOS: 1 days | End: 2018-11-05
Payer: COMMERCIAL

## 2018-11-05 DIAGNOSIS — K08.499 PARTIAL LOSS OF TEETH DUE TO OTHER SPECIFIED CAUSE, UNSPECIFIED CLASS: Chronic | ICD-10-CM

## 2018-11-05 DIAGNOSIS — Z00.00 ENCOUNTER FOR GENERAL ADULT MEDICAL EXAMINATION WITHOUT ABNORMAL FINDINGS: ICD-10-CM

## 2018-11-05 LAB — HPV HIGH+LOW RISK DNA PNL CVX: NOT DETECTED

## 2018-11-05 PROCEDURE — 77067 SCR MAMMO BI INCL CAD: CPT | Mod: 26

## 2018-11-05 PROCEDURE — 77063 BREAST TOMOSYNTHESIS BI: CPT | Mod: 26

## 2018-11-05 PROCEDURE — 77067 SCR MAMMO BI INCL CAD: CPT

## 2018-11-05 PROCEDURE — 77063 BREAST TOMOSYNTHESIS BI: CPT

## 2018-11-07 ENCOUNTER — OTHER (OUTPATIENT)
Age: 43
End: 2018-11-07

## 2018-11-07 LAB — CYTOLOGY CVX/VAG DOC THIN PREP: NORMAL

## 2018-11-14 ENCOUNTER — FORM ENCOUNTER (OUTPATIENT)
Age: 43
End: 2018-11-14

## 2018-11-15 ENCOUNTER — APPOINTMENT (OUTPATIENT)
Dept: MAMMOGRAPHY | Facility: IMAGING CENTER | Age: 43
End: 2018-11-15
Payer: COMMERCIAL

## 2018-11-15 ENCOUNTER — OUTPATIENT (OUTPATIENT)
Dept: OUTPATIENT SERVICES | Facility: HOSPITAL | Age: 43
LOS: 1 days | End: 2018-11-15
Payer: COMMERCIAL

## 2018-11-15 ENCOUNTER — APPOINTMENT (OUTPATIENT)
Dept: ULTRASOUND IMAGING | Facility: IMAGING CENTER | Age: 43
End: 2018-11-15
Payer: COMMERCIAL

## 2018-11-15 DIAGNOSIS — K08.499 PARTIAL LOSS OF TEETH DUE TO OTHER SPECIFIED CAUSE, UNSPECIFIED CLASS: Chronic | ICD-10-CM

## 2018-11-15 DIAGNOSIS — R92.2 INCONCLUSIVE MAMMOGRAM: ICD-10-CM

## 2018-11-15 PROCEDURE — 77065 DX MAMMO INCL CAD UNI: CPT | Mod: 26,RT

## 2018-11-15 PROCEDURE — 77065 DX MAMMO INCL CAD UNI: CPT

## 2018-11-15 PROCEDURE — G0279: CPT | Mod: 26

## 2018-11-15 PROCEDURE — 76642 ULTRASOUND BREAST LIMITED: CPT | Mod: 26,RT

## 2018-11-15 PROCEDURE — G0279: CPT

## 2018-11-15 PROCEDURE — 76642 ULTRASOUND BREAST LIMITED: CPT

## 2019-01-28 ENCOUNTER — RX RENEWAL (OUTPATIENT)
Age: 44
End: 2019-01-28

## 2019-02-13 ENCOUNTER — EMERGENCY (EMERGENCY)
Facility: HOSPITAL | Age: 44
LOS: 1 days | Discharge: HOME | End: 2019-02-13
Attending: EMERGENCY MEDICINE
Payer: COMMERCIAL

## 2019-02-13 VITALS
TEMPERATURE: 98 F | DIASTOLIC BLOOD PRESSURE: 92 MMHG | SYSTOLIC BLOOD PRESSURE: 133 MMHG | HEART RATE: 84 BPM | OXYGEN SATURATION: 100 % | RESPIRATION RATE: 18 BRPM

## 2019-02-13 VITALS
HEART RATE: 90 BPM | DIASTOLIC BLOOD PRESSURE: 99 MMHG | RESPIRATION RATE: 20 BRPM | HEIGHT: 64 IN | WEIGHT: 160.06 LBS | TEMPERATURE: 98 F | OXYGEN SATURATION: 100 % | SYSTOLIC BLOOD PRESSURE: 146 MMHG

## 2019-02-13 DIAGNOSIS — K08.499 PARTIAL LOSS OF TEETH DUE TO OTHER SPECIFIED CAUSE, UNSPECIFIED CLASS: Chronic | ICD-10-CM

## 2019-02-13 LAB
ALBUMIN SERPL ELPH-MCNC: 5 G/DL — SIGNIFICANT CHANGE UP (ref 3.3–5)
ALP SERPL-CCNC: 87 U/L — SIGNIFICANT CHANGE UP (ref 40–120)
ALT FLD-CCNC: 11 U/L — SIGNIFICANT CHANGE UP (ref 10–45)
ANION GAP SERPL CALC-SCNC: 11 MMOL/L — SIGNIFICANT CHANGE UP (ref 5–17)
APTT BLD: 30.1 SEC — SIGNIFICANT CHANGE UP (ref 27.5–36.3)
AST SERPL-CCNC: 13 U/L — SIGNIFICANT CHANGE UP (ref 10–40)
BASOPHILS # BLD AUTO: 0 K/UL — SIGNIFICANT CHANGE UP (ref 0–0.2)
BASOPHILS NFR BLD AUTO: 0.5 % — SIGNIFICANT CHANGE UP (ref 0–2)
BILIRUB SERPL-MCNC: 0.2 MG/DL — SIGNIFICANT CHANGE UP (ref 0.2–1.2)
BUN SERPL-MCNC: 11 MG/DL — SIGNIFICANT CHANGE UP (ref 7–23)
CALCIUM SERPL-MCNC: 9.4 MG/DL — SIGNIFICANT CHANGE UP (ref 8.4–10.5)
CHLORIDE SERPL-SCNC: 104 MMOL/L — SIGNIFICANT CHANGE UP (ref 96–108)
CO2 SERPL-SCNC: 24 MMOL/L — SIGNIFICANT CHANGE UP (ref 22–31)
CREAT SERPL-MCNC: 0.71 MG/DL — SIGNIFICANT CHANGE UP (ref 0.5–1.3)
D DIMER BLD IA.RAPID-MCNC: <150 NG/ML DDU — SIGNIFICANT CHANGE UP
EOSINOPHIL # BLD AUTO: 0 K/UL — SIGNIFICANT CHANGE UP (ref 0–0.5)
EOSINOPHIL NFR BLD AUTO: 0.6 % — SIGNIFICANT CHANGE UP (ref 0–6)
GLUCOSE SERPL-MCNC: 107 MG/DL — HIGH (ref 70–99)
HCG SERPL-ACNC: <2 MIU/ML — SIGNIFICANT CHANGE UP
HCT VFR BLD CALC: 36 % — SIGNIFICANT CHANGE UP (ref 34.5–45)
HGB BLD-MCNC: 11.8 G/DL — SIGNIFICANT CHANGE UP (ref 11.5–15.5)
INR BLD: 1.04 RATIO — SIGNIFICANT CHANGE UP (ref 0.88–1.16)
LYMPHOCYTES # BLD AUTO: 1.4 K/UL — SIGNIFICANT CHANGE UP (ref 1–3.3)
LYMPHOCYTES # BLD AUTO: 21.2 % — SIGNIFICANT CHANGE UP (ref 13–44)
MAGNESIUM SERPL-MCNC: 2 MG/DL — SIGNIFICANT CHANGE UP (ref 1.6–2.6)
MCHC RBC-ENTMCNC: 24.2 PG — LOW (ref 27–34)
MCHC RBC-ENTMCNC: 32.9 GM/DL — SIGNIFICANT CHANGE UP (ref 32–36)
MCV RBC AUTO: 73.7 FL — LOW (ref 80–100)
MONOCYTES # BLD AUTO: 0.4 K/UL — SIGNIFICANT CHANGE UP (ref 0–0.9)
MONOCYTES NFR BLD AUTO: 5.9 % — SIGNIFICANT CHANGE UP (ref 2–14)
NEUTROPHILS # BLD AUTO: 4.9 K/UL — SIGNIFICANT CHANGE UP (ref 1.8–7.4)
NEUTROPHILS NFR BLD AUTO: 71.9 % — SIGNIFICANT CHANGE UP (ref 43–77)
PLATELET # BLD AUTO: 390 K/UL — SIGNIFICANT CHANGE UP (ref 150–400)
POTASSIUM SERPL-MCNC: 4.2 MMOL/L — SIGNIFICANT CHANGE UP (ref 3.5–5.3)
POTASSIUM SERPL-SCNC: 4.2 MMOL/L — SIGNIFICANT CHANGE UP (ref 3.5–5.3)
PROT SERPL-MCNC: 8.2 G/DL — SIGNIFICANT CHANGE UP (ref 6–8.3)
PROTHROM AB SERPL-ACNC: 11.9 SEC — SIGNIFICANT CHANGE UP (ref 10–12.9)
RBC # BLD: 4.89 M/UL — SIGNIFICANT CHANGE UP (ref 3.8–5.2)
RBC # FLD: 15 % — HIGH (ref 10.3–14.5)
SODIUM SERPL-SCNC: 139 MMOL/L — SIGNIFICANT CHANGE UP (ref 135–145)
TROPONIN T, HIGH SENSITIVITY RESULT: <6 NG/L — SIGNIFICANT CHANGE UP (ref 0–51)
TSH SERPL-MCNC: 2.58 UIU/ML — SIGNIFICANT CHANGE UP (ref 0.27–4.2)
WBC # BLD: 6.9 K/UL — SIGNIFICANT CHANGE UP (ref 3.8–10.5)
WBC # FLD AUTO: 6.9 K/UL — SIGNIFICANT CHANGE UP (ref 3.8–10.5)

## 2019-02-13 PROCEDURE — 83735 ASSAY OF MAGNESIUM: CPT

## 2019-02-13 PROCEDURE — 80053 COMPREHEN METABOLIC PANEL: CPT

## 2019-02-13 PROCEDURE — 99284 EMERGENCY DEPT VISIT MOD MDM: CPT | Mod: 25

## 2019-02-13 PROCEDURE — 71046 X-RAY EXAM CHEST 2 VIEWS: CPT | Mod: 26

## 2019-02-13 PROCEDURE — 84702 CHORIONIC GONADOTROPIN TEST: CPT

## 2019-02-13 PROCEDURE — 99285 EMERGENCY DEPT VISIT HI MDM: CPT | Mod: 25

## 2019-02-13 PROCEDURE — 84443 ASSAY THYROID STIM HORMONE: CPT

## 2019-02-13 PROCEDURE — 93005 ELECTROCARDIOGRAM TRACING: CPT

## 2019-02-13 PROCEDURE — 85610 PROTHROMBIN TIME: CPT

## 2019-02-13 PROCEDURE — 84484 ASSAY OF TROPONIN QUANT: CPT

## 2019-02-13 PROCEDURE — 85379 FIBRIN DEGRADATION QUANT: CPT

## 2019-02-13 PROCEDURE — 93010 ELECTROCARDIOGRAM REPORT: CPT | Mod: 59

## 2019-02-13 PROCEDURE — 71046 X-RAY EXAM CHEST 2 VIEWS: CPT

## 2019-02-13 PROCEDURE — 85730 THROMBOPLASTIN TIME PARTIAL: CPT

## 2019-02-13 PROCEDURE — 85027 COMPLETE CBC AUTOMATED: CPT

## 2019-02-13 RX ORDER — ASPIRIN/CALCIUM CARB/MAGNESIUM 324 MG
162 TABLET ORAL ONCE
Qty: 0 | Refills: 0 | Status: COMPLETED | OUTPATIENT
Start: 2019-02-13 | End: 2019-02-13

## 2019-02-13 RX ADMIN — Medication 162 MILLIGRAM(S): at 10:09

## 2019-02-13 NOTE — ED ADULT NURSE REASSESSMENT NOTE - NS ED NURSE REASSESS COMMENT FT1
Patient reports "feeling better," and states her heart feels like it has slowed down. plan of care discussed. safety and comfort measures maintained.

## 2019-02-13 NOTE — ED PROVIDER NOTE - OBJECTIVE STATEMENT
43F w/DJD s/p micro discectomy 1y ago, anxiety on intermittent alprazolam 1mg (last took 4d ago - takes q2w) p/w palpitations constant since 130AM. +chest pressure/burning, anxiety, nausea, lightheadedness and gen weakness. +3 episodes loose stool last night. LMP 2d ago. Denies f/c, cough, sob, syncope, LE edema, OCP use, immobilization, FH coagulopathy, FH cardiac dz, vomiting, prior stress/echo, prior holter, menorrhagia/metrorrhagia, melena/BRBPR, changes in weight/hair/skin, prior thyroid dz, FH thyroid dz, current smoking/prior drug use, focal weakness/numbness. Former daily smoker, quit 10y ago. Took advil PM x1 last night, no other medications. Denies new stressors, psych/counseling, other medications, inc back pain.

## 2019-02-13 NOTE — ED PROVIDER NOTE - CLINICAL SUMMARY MEDICAL DECISION MAKING FREE TEXT BOX
Meli: 43F w/DJD, anxiety p/w palp, cp, nausea. R/o arrhythmia, hyperthyroid, ACS, electrolyte abnormality, PNA, will dimer for PE risk assessment, and r/o symptomatic anemia. Labs, EKG/tele, ASA, CXR - anticipate OP f/u for holter.

## 2019-02-13 NOTE — ED PROVIDER NOTE - NSFOLLOWUPCLINICS_GEN_ALL_ED_FT
[facility]  Cardiology  [address] [place] [id number]  Phone: [phone]  Fax:     [facility]  Psychiatry  [id number], [place] [address]  Phone: [phone]  Fax:   Follow Up Time:

## 2019-02-13 NOTE — ED PROVIDER NOTE - CARE PLAN
Principal Discharge DX:	Palpitations  Secondary Diagnosis:	Chest pain, unspecified type  Secondary Diagnosis:	Anxiety

## 2019-02-13 NOTE — ED PROVIDER NOTE - ATTENDING CONTRIBUTION TO CARE
44 yo female, hx of anxiety, with palpitations this AM, some chest discomfort.  Sinus tach on EKG, no evidence of STEMI, otherwise NAD, conversant.  Anxiety vs thyroid dysfunction vs PE, much less likely ACS in this premenopausal female with no family history of MI at a young age.  Will check labs, trop, d-dimer, TSH, reassess.

## 2019-02-13 NOTE — ED PROVIDER NOTE - NSFOLLOWUPINSTRUCTIONS_ED_ALL_ED_FT
-please walk in to cardiology clinic tomorrow after 9AM for likely holter monitor placement    -please follow-up with a primary doctor, psychiatrist or therapist for proper anxiety treatment    -please return to ER should you develop cp, sob, dizziness, fainting, worsening palpitations, fever, suicidality

## 2019-02-13 NOTE — ED PROVIDER NOTE - INTERPRETATION
EKG reviewed for rate, rhythm, axis, intervals and segments, including QRS morphology, P wave appearance T wave appearance, SD interval, and QT interval.  I find the EKG to be unremarkable in all of these regards except as follows: sinus tach

## 2019-02-13 NOTE — ED ADULT NURSE NOTE - OBJECTIVE STATEMENT
Patient is a 43 y female presenting to the ED ambulatory from home with c/o chest pain and "heart racing." Pt reports being woken up from sleep at 1:30 last night with pressure and burning in the chest. Pt states her "heart felt like it was racing." Pt also reports feeling nauseous, lightheaded and having chills last night. Denies N/V, lightheadedness or chills at this time. Pt reports her "heart continues to race." Denies SOB, abdominal pain, burning upon urination or urgency denies this occurring in the past. Pt reports taking alprazolam as needed for anxiety. Denies any other pertinent medical history of daily medications. A&Ox4, neuro intact. Normal heart sounds, S1 and S2 heard upon auscultation. Lungs clear bilaterally. LMP 2 days ago. 20 gauge peripheral IV placed in right AC. Will continue to reassess patient. Patient is a 43 y female presenting to the ED ambulatory from home with c/o chest pain and "heart racing." A&Ox4. Pt reports being woken up from sleep at 1:30 last night with pressure and burning in the chest. Pt states her "heart felt like it was racing." Pt also reports feeling nauseous, lightheaded and having chills last night. Denies N/V, lightheadedness or chills at this time. Denies any chest discomfort, burning, or pressure at this time. Pt reports her "heart continues to race." Denies SOB, abdominal pain, burning upon urination or urgency. EKG performed at triage and pt placed on CM. Currently has NSR. Pt reports taking alprazolam as needed for anxiety, denies this occurring in the past. Denies any other pertinent medical history of daily medications. Patient is well appearing. Neuro intact. Normal heart sounds, S1 and S2 heard upon auscultation. Lungs clear bilaterally. LMP 2 days ago. 20 gauge peripheral IV placed in right AC. Will continue to reassess patient.

## 2019-02-13 NOTE — ED PROVIDER NOTE - PROGRESS NOTE DETAILS
FARTUN: tachycardia still present but improved.  constant chest discomfort for >6h with normal hST -- not ACS.  no risk factors for PE and negative d-dimer -- doubt PE.  TSH still pending.  will give patient f/u for cardiology in <24h for holter and further management.

## 2019-02-25 ENCOUNTER — APPOINTMENT (OUTPATIENT)
Dept: CARDIOLOGY | Facility: CLINIC | Age: 44
End: 2019-02-25

## 2019-03-29 ENCOUNTER — TRANSCRIPTION ENCOUNTER (OUTPATIENT)
Age: 44
End: 2019-03-29

## 2019-04-19 ENCOUNTER — TRANSCRIPTION ENCOUNTER (OUTPATIENT)
Age: 44
End: 2019-04-19

## 2019-10-07 ENCOUNTER — APPOINTMENT (OUTPATIENT)
Dept: ORTHOPEDIC SURGERY | Facility: CLINIC | Age: 44
End: 2019-10-07
Payer: COMMERCIAL

## 2019-10-07 DIAGNOSIS — M54.12 RADICULOPATHY, CERVICAL REGION: ICD-10-CM

## 2019-10-07 PROCEDURE — 99214 OFFICE O/P EST MOD 30 MIN: CPT

## 2019-10-07 PROCEDURE — 72100 X-RAY EXAM L-S SPINE 2/3 VWS: CPT

## 2019-10-07 PROCEDURE — 72040 X-RAY EXAM NECK SPINE 2-3 VW: CPT

## 2019-10-07 NOTE — DISCUSSION/SUMMARY
[de-identified] : cervical and lumbar radiculopathy\par should see primary surgeon\par discussed options\par Dr. Gerber-neuro evaluation.\par seeing OB/GYN for issues\par All questions were answered, all alternatives discussed and the patient is in complete agreement with that plan. Follow-up appointment as instructed. Any issues and the patient will call or come in sooner.\par

## 2019-10-11 ENCOUNTER — OTHER (OUTPATIENT)
Age: 44
End: 2019-10-11

## 2019-10-14 LAB — TSH SERPL-ACNC: 1.36 UIU/ML

## 2019-11-15 ENCOUNTER — APPOINTMENT (OUTPATIENT)
Dept: OBGYN | Facility: CLINIC | Age: 44
End: 2019-11-15
Payer: COMMERCIAL

## 2019-11-15 VITALS
WEIGHT: 160 LBS | HEIGHT: 64 IN | SYSTOLIC BLOOD PRESSURE: 139 MMHG | HEART RATE: 80 BPM | BODY MASS INDEX: 27.31 KG/M2 | DIASTOLIC BLOOD PRESSURE: 80 MMHG

## 2019-11-15 DIAGNOSIS — R92.2 INCONCLUSIVE MAMMOGRAM: ICD-10-CM

## 2019-11-15 PROCEDURE — 99396 PREV VISIT EST AGE 40-64: CPT

## 2019-11-15 NOTE — HISTORY OF PRESENT ILLNESS
[1 Year Ago] : 1 year ago [Good] : being in good health [Healthy Diet] : a healthy diet [Last Mammogram ___] : Last Mammogram was [unfilled] [Regular Exercise] : regular exercise [Last Pap ___] : Last cervical pap smear was [unfilled] [Reproductive Age] : is of reproductive age [Contraception] : uses contraception [Withdrawal] : uses withdrawal [Pregnancy History] : pregnancy history: [Sexually Active] : is sexually active [Up to Date] : up to date with ~his/her~ STD screening [Weight Concerns] : no concerns with her weight [Menstrual Problems] : reports normal menses

## 2019-11-15 NOTE — CHIEF COMPLAINT
[Annual Visit] : annual visit [FreeTextEntry1] : 45YO P2 LMP 11/7  10/10 very heavy and painful, missed Sept menses. Annual checkup without complaints.

## 2019-11-18 LAB — HPV HIGH+LOW RISK DNA PNL CVX: NOT DETECTED

## 2019-11-22 LAB — CYTOLOGY CVX/VAG DOC THIN PREP: NORMAL

## 2019-12-26 ENCOUNTER — APPOINTMENT (OUTPATIENT)
Dept: MRI IMAGING | Facility: CLINIC | Age: 44
End: 2019-12-26

## 2020-01-03 ENCOUNTER — TRANSCRIPTION ENCOUNTER (OUTPATIENT)
Age: 45
End: 2020-01-03

## 2020-01-10 ENCOUNTER — APPOINTMENT (OUTPATIENT)
Dept: MAMMOGRAPHY | Facility: IMAGING CENTER | Age: 45
End: 2020-01-10
Payer: COMMERCIAL

## 2020-01-10 ENCOUNTER — APPOINTMENT (OUTPATIENT)
Dept: ULTRASOUND IMAGING | Facility: IMAGING CENTER | Age: 45
End: 2020-01-10
Payer: COMMERCIAL

## 2020-01-10 ENCOUNTER — OUTPATIENT (OUTPATIENT)
Dept: OUTPATIENT SERVICES | Facility: HOSPITAL | Age: 45
LOS: 1 days | End: 2020-01-10
Payer: COMMERCIAL

## 2020-01-10 DIAGNOSIS — K08.499 PARTIAL LOSS OF TEETH DUE TO OTHER SPECIFIED CAUSE, UNSPECIFIED CLASS: Chronic | ICD-10-CM

## 2020-01-10 DIAGNOSIS — Z00.00 ENCOUNTER FOR GENERAL ADULT MEDICAL EXAMINATION WITHOUT ABNORMAL FINDINGS: ICD-10-CM

## 2020-01-10 PROCEDURE — 76641 ULTRASOUND BREAST COMPLETE: CPT

## 2020-01-10 PROCEDURE — 76641 ULTRASOUND BREAST COMPLETE: CPT | Mod: 26,50

## 2020-01-10 PROCEDURE — 77063 BREAST TOMOSYNTHESIS BI: CPT | Mod: 26

## 2020-01-10 PROCEDURE — 77067 SCR MAMMO BI INCL CAD: CPT | Mod: 26

## 2020-01-10 PROCEDURE — 77067 SCR MAMMO BI INCL CAD: CPT

## 2020-01-10 PROCEDURE — 77063 BREAST TOMOSYNTHESIS BI: CPT

## 2020-01-15 ENCOUNTER — APPOINTMENT (OUTPATIENT)
Dept: MRI IMAGING | Facility: CLINIC | Age: 45
End: 2020-01-15
Payer: COMMERCIAL

## 2020-01-15 ENCOUNTER — OUTPATIENT (OUTPATIENT)
Dept: OUTPATIENT SERVICES | Facility: HOSPITAL | Age: 45
LOS: 1 days | End: 2020-01-15
Payer: COMMERCIAL

## 2020-01-15 DIAGNOSIS — Z00.8 ENCOUNTER FOR OTHER GENERAL EXAMINATION: ICD-10-CM

## 2020-01-15 DIAGNOSIS — K08.499 PARTIAL LOSS OF TEETH DUE TO OTHER SPECIFIED CAUSE, UNSPECIFIED CLASS: Chronic | ICD-10-CM

## 2020-01-15 PROCEDURE — A9585: CPT

## 2020-01-15 PROCEDURE — 70553 MRI BRAIN STEM W/O & W/DYE: CPT

## 2020-01-15 PROCEDURE — 70553 MRI BRAIN STEM W/O & W/DYE: CPT | Mod: 26

## 2020-01-15 PROCEDURE — 72156 MRI NECK SPINE W/O & W/DYE: CPT

## 2020-01-15 PROCEDURE — 72156 MRI NECK SPINE W/O & W/DYE: CPT | Mod: 26

## 2020-06-12 ENCOUNTER — APPOINTMENT (OUTPATIENT)
Dept: OBGYN | Facility: CLINIC | Age: 45
End: 2020-06-12
Payer: COMMERCIAL

## 2020-06-12 ENCOUNTER — APPOINTMENT (OUTPATIENT)
Dept: ANTEPARTUM | Facility: CLINIC | Age: 45
End: 2020-06-12
Payer: COMMERCIAL

## 2020-06-12 ENCOUNTER — ASOB RESULT (OUTPATIENT)
Age: 45
End: 2020-06-12

## 2020-06-12 VITALS — DIASTOLIC BLOOD PRESSURE: 90 MMHG | SYSTOLIC BLOOD PRESSURE: 175 MMHG | HEIGHT: 64 IN

## 2020-06-12 PROCEDURE — 76817 TRANSVAGINAL US OBSTETRIC: CPT

## 2020-06-12 PROCEDURE — 99213 OFFICE O/P EST LOW 20 MIN: CPT | Mod: 25

## 2020-06-12 PROCEDURE — 76830 TRANSVAGINAL US NON-OB: CPT

## 2020-06-12 PROCEDURE — 76801 OB US < 14 WKS SINGLE FETUS: CPT

## 2020-06-12 NOTE — PHYSICAL EXAM
[Normal] : uterus [Retroversion] : retroverted [No Bleeding] : there was no active vaginal bleeding [Tenderness] : nontender [Uterine Adnexae] : were not tender and not enlarged [Enlarged ___ wks] : enlarged [unfilled] ~Uweeks

## 2020-06-12 NOTE — PROCEDURE
[Intrauterine Pregnancy] : intrauterine pregnancy [Yolk Sac] : yolk sac present [Fetal Heart] : no fetal heart [FreeTextEntry1] : Fetus and yolk sac present. fetus looks like 6-7 wk GA but no FH visualized [WNL] : Transvaginal OB Sonogram - abnormalities noted

## 2020-06-12 NOTE — CHIEF COMPLAINT
[Follow Up] : follow up GYN visit [FreeTextEntry1] : 45YO P2 LMP 4/18 thinks she's pregnant c/o mastodynia, bloating, some cramping.

## 2020-06-13 LAB — HCG SERPL-MCNC: ABNORMAL MIU/ML

## 2020-06-17 ENCOUNTER — APPOINTMENT (OUTPATIENT)
Dept: OBGYN | Facility: CLINIC | Age: 45
End: 2020-06-17
Payer: COMMERCIAL

## 2020-06-17 VITALS
BODY MASS INDEX: 27.83 KG/M2 | WEIGHT: 163 LBS | SYSTOLIC BLOOD PRESSURE: 122 MMHG | HEIGHT: 64 IN | DIASTOLIC BLOOD PRESSURE: 82 MMHG

## 2020-06-17 PROCEDURE — 99213 OFFICE O/P EST LOW 20 MIN: CPT

## 2020-06-17 PROCEDURE — 76857 US EXAM PELVIC LIMITED: CPT

## 2020-06-17 NOTE — PHYSICAL EXAM
[Normal] : uterus [Moderate] : there was moderate vaginal bleeding [Uterine Adnexae] : were not tender and not enlarged [de-identified] : abdomen soft non tender

## 2020-06-17 NOTE — PROCEDURE
[Yolk Sac] : no yolk sac [Intrauterine Pregnancy] : no intrauterine pregnancy [Fetal Heart] : no fetal heart [FreeTextEntry1] : Thickened endometrium \par No sac seen

## 2020-06-17 NOTE — CHIEF COMPLAINT
[Follow Up] : follow up GYN visit [FreeTextEntry1] : Patient took cytotec and had very severe cramping and bleeding\par She was concerned but it is now better

## 2020-06-19 ENCOUNTER — APPOINTMENT (OUTPATIENT)
Dept: OBGYN | Facility: CLINIC | Age: 45
End: 2020-06-19

## 2020-06-26 LAB — HCG SERPL-MCNC: 55 MIU/ML

## 2020-07-07 LAB — HCG SERPL-MCNC: 6 MIU/ML

## 2020-11-18 ENCOUNTER — EMERGENCY (EMERGENCY)
Facility: HOSPITAL | Age: 45
LOS: 1 days | Discharge: HOME | End: 2020-11-18
Attending: EMERGENCY MEDICINE
Payer: COMMERCIAL

## 2020-11-18 VITALS
WEIGHT: 154.1 LBS | OXYGEN SATURATION: 100 % | HEART RATE: 106 BPM | RESPIRATION RATE: 20 BRPM | TEMPERATURE: 99 F | SYSTOLIC BLOOD PRESSURE: 166 MMHG | DIASTOLIC BLOOD PRESSURE: 112 MMHG | HEIGHT: 64 IN

## 2020-11-18 VITALS
RESPIRATION RATE: 15 BRPM | HEART RATE: 94 BPM | SYSTOLIC BLOOD PRESSURE: 161 MMHG | DIASTOLIC BLOOD PRESSURE: 90 MMHG | OXYGEN SATURATION: 100 %

## 2020-11-18 DIAGNOSIS — K08.499 PARTIAL LOSS OF TEETH DUE TO OTHER SPECIFIED CAUSE, UNSPECIFIED CLASS: Chronic | ICD-10-CM

## 2020-11-18 LAB
ALBUMIN SERPL ELPH-MCNC: 5.3 G/DL — HIGH (ref 3.3–5)
ALP SERPL-CCNC: 89 U/L — SIGNIFICANT CHANGE UP (ref 40–120)
ALT FLD-CCNC: 20 U/L — SIGNIFICANT CHANGE UP (ref 10–45)
ANION GAP SERPL CALC-SCNC: 12 MMOL/L — SIGNIFICANT CHANGE UP (ref 5–17)
APTT BLD: 31.9 SEC — SIGNIFICANT CHANGE UP (ref 27.5–35.5)
AST SERPL-CCNC: 17 U/L — SIGNIFICANT CHANGE UP (ref 10–40)
BASOPHILS # BLD AUTO: 0.05 K/UL — SIGNIFICANT CHANGE UP (ref 0–0.2)
BASOPHILS NFR BLD AUTO: 0.7 % — SIGNIFICANT CHANGE UP (ref 0–2)
BILIRUB SERPL-MCNC: 0.2 MG/DL — SIGNIFICANT CHANGE UP (ref 0.2–1.2)
BUN SERPL-MCNC: 14 MG/DL — SIGNIFICANT CHANGE UP (ref 7–23)
CALCIUM SERPL-MCNC: 9.9 MG/DL — SIGNIFICANT CHANGE UP (ref 8.4–10.5)
CHLORIDE SERPL-SCNC: 102 MMOL/L — SIGNIFICANT CHANGE UP (ref 96–108)
CO2 SERPL-SCNC: 27 MMOL/L — SIGNIFICANT CHANGE UP (ref 22–31)
CREAT SERPL-MCNC: 0.75 MG/DL — SIGNIFICANT CHANGE UP (ref 0.5–1.3)
EOSINOPHIL # BLD AUTO: 0.04 K/UL — SIGNIFICANT CHANGE UP (ref 0–0.5)
EOSINOPHIL NFR BLD AUTO: 0.6 % — SIGNIFICANT CHANGE UP (ref 0–6)
GLUCOSE SERPL-MCNC: 102 MG/DL — HIGH (ref 70–99)
HCT VFR BLD CALC: 42.3 % — SIGNIFICANT CHANGE UP (ref 34.5–45)
HGB BLD-MCNC: 13.2 G/DL — SIGNIFICANT CHANGE UP (ref 11.5–15.5)
IMM GRANULOCYTES NFR BLD AUTO: 0.3 % — SIGNIFICANT CHANGE UP (ref 0–1.5)
INR BLD: 1.08 RATIO — SIGNIFICANT CHANGE UP (ref 0.88–1.16)
LYMPHOCYTES # BLD AUTO: 1.51 K/UL — SIGNIFICANT CHANGE UP (ref 1–3.3)
LYMPHOCYTES # BLD AUTO: 21.3 % — SIGNIFICANT CHANGE UP (ref 13–44)
MCHC RBC-ENTMCNC: 26.3 PG — LOW (ref 27–34)
MCHC RBC-ENTMCNC: 31.2 GM/DL — LOW (ref 32–36)
MCV RBC AUTO: 84.3 FL — SIGNIFICANT CHANGE UP (ref 80–100)
MONOCYTES # BLD AUTO: 0.46 K/UL — SIGNIFICANT CHANGE UP (ref 0–0.9)
MONOCYTES NFR BLD AUTO: 6.5 % — SIGNIFICANT CHANGE UP (ref 2–14)
NEUTROPHILS # BLD AUTO: 5 K/UL — SIGNIFICANT CHANGE UP (ref 1.8–7.4)
NEUTROPHILS NFR BLD AUTO: 70.6 % — SIGNIFICANT CHANGE UP (ref 43–77)
NRBC # BLD: 0 /100 WBCS — SIGNIFICANT CHANGE UP (ref 0–0)
PLATELET # BLD AUTO: 355 K/UL — SIGNIFICANT CHANGE UP (ref 150–400)
POTASSIUM SERPL-MCNC: 3.6 MMOL/L — SIGNIFICANT CHANGE UP (ref 3.5–5.3)
POTASSIUM SERPL-SCNC: 3.6 MMOL/L — SIGNIFICANT CHANGE UP (ref 3.5–5.3)
PROT SERPL-MCNC: 8.2 G/DL — SIGNIFICANT CHANGE UP (ref 6–8.3)
PROTHROM AB SERPL-ACNC: 12.9 SEC — SIGNIFICANT CHANGE UP (ref 10.6–13.6)
RBC # BLD: 5.02 M/UL — SIGNIFICANT CHANGE UP (ref 3.8–5.2)
RBC # FLD: 16.4 % — HIGH (ref 10.3–14.5)
SARS-COV-2 RNA SPEC QL NAA+PROBE: SIGNIFICANT CHANGE UP
SODIUM SERPL-SCNC: 141 MMOL/L — SIGNIFICANT CHANGE UP (ref 135–145)
TROPONIN T, HIGH SENSITIVITY RESULT: <6 NG/L — SIGNIFICANT CHANGE UP (ref 0–51)
TROPONIN T, HIGH SENSITIVITY RESULT: <6 NG/L — SIGNIFICANT CHANGE UP (ref 0–51)
WBC # BLD: 7.08 K/UL — SIGNIFICANT CHANGE UP (ref 3.8–10.5)
WBC # FLD AUTO: 7.08 K/UL — SIGNIFICANT CHANGE UP (ref 3.8–10.5)

## 2020-11-18 PROCEDURE — U0003: CPT

## 2020-11-18 PROCEDURE — 71046 X-RAY EXAM CHEST 2 VIEWS: CPT

## 2020-11-18 PROCEDURE — 93005 ELECTROCARDIOGRAM TRACING: CPT | Mod: 76,XU

## 2020-11-18 PROCEDURE — 93010 ELECTROCARDIOGRAM REPORT: CPT | Mod: 59

## 2020-11-18 PROCEDURE — 85610 PROTHROMBIN TIME: CPT

## 2020-11-18 PROCEDURE — 84484 ASSAY OF TROPONIN QUANT: CPT

## 2020-11-18 PROCEDURE — 71046 X-RAY EXAM CHEST 2 VIEWS: CPT | Mod: 26

## 2020-11-18 PROCEDURE — 93017 CV STRESS TEST TRACING ONLY: CPT

## 2020-11-18 PROCEDURE — 85025 COMPLETE CBC W/AUTO DIFF WBC: CPT

## 2020-11-18 PROCEDURE — 99218: CPT

## 2020-11-18 PROCEDURE — 85730 THROMBOPLASTIN TIME PARTIAL: CPT

## 2020-11-18 PROCEDURE — 93308 TTE F-UP OR LMTD: CPT

## 2020-11-18 PROCEDURE — 80053 COMPREHEN METABOLIC PANEL: CPT

## 2020-11-18 PROCEDURE — G0378: CPT

## 2020-11-18 PROCEDURE — 93308 TTE F-UP OR LMTD: CPT | Mod: 26

## 2020-11-18 PROCEDURE — 93016 CV STRESS TEST SUPVJ ONLY: CPT

## 2020-11-18 PROCEDURE — 93018 CV STRESS TEST I&R ONLY: CPT

## 2020-11-18 PROCEDURE — 99284 EMERGENCY DEPT VISIT MOD MDM: CPT | Mod: 25

## 2020-11-18 RX ORDER — ASPIRIN/CALCIUM CARB/MAGNESIUM 324 MG
81 TABLET ORAL DAILY
Refills: 0 | Status: DISCONTINUED | OUTPATIENT
Start: 2020-11-18 | End: 2020-11-21

## 2020-11-18 NOTE — ED PROVIDER NOTE - CLINICAL SUMMARY MEDICAL DECISION MAKING FREE TEXT BOX
44 y/o F p/w elevated BP and chest pain, pressure-like in nature, intermittently exertional x3 days. PE remarkable only for mild tachycardia, patient visibly nervous. Never had provocative testing in the past. Will obtain r/o ACS, likely CDU for CTC/TTE.

## 2020-11-18 NOTE — ED CDU PROVIDER INITIAL DAY NOTE - ATTENDING CONTRIBUTION TO CARE
Patient is a 44 yo F with no chronic medical problems here for evaluation of chest pressure x 3 days. Patient reports having high blood pressure which she discussed with her pcp and current plan was for monitoring it. She states that she has been checking it at home and recently it has been elevated to SBP > 160. She has noted midsternal, non-radiating chest pressure that is intermittent, worse at night and in the morning and with exertion. She usually uses the treadmill every morning and has not been doing so. Denies shortness of breath, nausea, vomiting, leg swelling. No travel. No fevers, chills.     VS noted  Gen. no acute distress, Non toxic   HEENT: EOMI, mmm  Lungs: CTAB/L no C/ W /R   CVS: RRR   Abd; Soft non tender, non distended, no CVA tenderness  Ext: no edema  Skin: no rash  Neuro AAOx3 non focal clear speech  a/p: chest pressure - ekg is w/o ischemic changes. plan for ACS work up. No prior stress/ cardiac work up. CDU for stress, tele monitoring.   - Kenney WILL

## 2020-11-18 NOTE — ED CDU PROVIDER DISPOSITION NOTE - CLINICAL COURSE
44 y/o F newly diagnosed HTN not on any medications at this time presenting with chest pain and concern for high blood pressure this week. Patient states a few months ago she saw her PMD and in the office her BP was elevated, since that time he has been advising her to monitor her blood pressure at home but has not started any antihypertensives. States that she has been checking her BP at home the last 3 days and it has been 160s/100s. She reports that yesterday her BP was better 130/90, but states that on Sunday she developed slight pressure in her chest. States that this pressure has been becoming more significant since it started, feels like a pressure in the center of her chest, 'going out to the sides' but does not radiate anywhere else. Reports that she suffers from GERD and has reflux quite frequently, and that this does not feel anything like that.   Serial Trop negative, exercise stress test- great mets for age and gender. no pain or arrythmia during the stress. Stable vitals. No complaints while in CDU. Pt to follow up with PMD and cards. Strict return precautions advised. Case discussed with Dr. Duke.

## 2020-11-18 NOTE — ED ADULT NURSE REASSESSMENT NOTE - NS ED NURSE REASSESS COMMENT FT1
Pt resting comfortably in bed. Cardiac monitor on, indicating NSR. Chest x-ray done. Verbalizes understanding of plan of care. Denies chest pain, SOB, discomfort.

## 2020-11-18 NOTE — ED CDU PROVIDER DISPOSITION NOTE - NSFOLLOWUPINSTRUCTIONS_ED_ALL_ED_FT
1. Follow up with your PMD and/or cardiologist within 48-72 hours.   2. Show copies of your reports given to you. Recommend Aspirin 81mg over the counter daily until further evaluation.  Take all of your other medications as previously prescribed. Cardiology [phone]  3. Worsening or continued chest pain, shortness of breath, weakness, return to ED. 1. Follow up with your PMD and/or cardiologist within 48-72 hours.   2. Show copies of your reports given to you. Recommend Aspirin 81mg over the counter daily until further evaluation.  Take all of your other medications as previously prescribed. Cardiology [phone]  Pepcid 20mg twice a day for about one month. Maalox over the counter as directed.   3. Worsening or continued chest pain, shortness of breath, weakness, return to ED.

## 2020-11-18 NOTE — ED CDU PROVIDER INITIAL DAY NOTE - PROGRESS NOTE DETAILS
Patient resting in bed comfortably. No distress, no complaints. Vital Signs Stable. No events on telemetry monitor; just returned from stress test.

## 2020-11-18 NOTE — ED CDU PROVIDER INITIAL DAY NOTE - DETAILS
CHEST PAIN  -[facility]  -JENNIEMI  -[facility] EVAL  -STRESS TEST  -CASE D/W ATTENDING

## 2020-11-18 NOTE — ED ADULT NURSE NOTE - SUICIDE SCREENING DEPRESSION
ED Attending Physician Note      Patient : Stew Lawson Age: 71 year old Sex: male   MRN: [id number] Encounter Date: 8/25/2020      History     Chief Complaint   Patient presents with   • Chemical Exposure     HPI   71 year old male, with a past medical history significant for HTN, otherwise healthy, presents to the emergency department for evaluation after a chemical exposure. Patient states he was cleaning out a kitchen drain around 1400 today with [employer] , when some of the  got into both of his eyes. Both of his eyes immediately began to hurt and become red. He washed both of his eyes out with saline prior to arrival. At this time in the ED, he notes the pain in his right eye is feeling improved, but the pain in his left eye is becoming worse, with some associated blurry vision in that eye. No blurry vision in the right eye. He also endorses some skin irritation on his face. He denies any eye discharge, skin burning, rash, facial swelling, throat swelling, SOB, CP, neck pain, abd pain, extremity pain/weakness, nausea, vomiting, fever/chills, syncope, lightheadedness or dizziness. No history of eye surgeries.     No Known Allergies    Current Discharge Medication List          Current Discharge Medication List      New Prescriptions    Details   erythromycin (ILOTYCIN) ophthalmic ointment Place 0.5 inches into both eyes every 6 hours for 7 days.  Qty: 10 g, Refills: 0             Past Medical History:   Diagnosis Date   • HTN (hypertension)        Denies pshx  fam hx noncontributory  Denies etoh tobacco illicits    Review of Systems   Constitutional: Negative for chills, diaphoresis and fever.   HENT: Negative for congestion and sore throat.    Eyes: Positive for pain and redness. Negative for discharge.   Respiratory: Negative for cough, shortness of breath and wheezing.    Cardiovascular: Negative for chest pain and leg swelling.   Gastrointestinal: Negative for  abdominal distention, nausea and vomiting.   Endocrine: Negative for cold intolerance and heat intolerance.   Genitourinary: Negative for dysuria, flank pain and urgency.   Musculoskeletal: Negative for arthralgias, back pain and myalgias.   Skin: Negative for rash and wound.   Neurological: Negative for dizziness, weakness and numbness.   Psychiatric/Behavioral: Negative for agitation and behavioral problems.       Physical Exam     ED Triage Vitals [08/25/20 1644]   ED Triage Vitals Group      Temp 96.8 °F (36 °C)      Heart Rate (!) 112      Resp 20      BP (!) 169/98      SpO2 97 %      EtCO2 mmHg       Height       Weight 178 lb (80.7 kg)      Weight Scale Used ED Actual      BMI (Calculated)       IBW/kg (Calculated)        Physical Exam  Vitals signs and nursing note reviewed.   Constitutional:       General: He is not in acute distress.     Appearance: Normal appearance. He is not ill-appearing, toxic-appearing or diaphoretic.   HENT:      Head: Normocephalic and atraumatic.      Right Ear: Tympanic membrane normal.      Left Ear: Tympanic membrane normal.      Nose: Nose normal.      Mouth/Throat:      Pharynx: Oropharynx is clear. No oropharyngeal exudate or posterior oropharyngeal erythema.   Eyes:      Extraocular Movements: Extraocular movements intact.      Comments: Significantly injected sclera. PERRL. PH 9 in R 10 in L.    Neck:      Musculoskeletal: Normal range of motion. No muscular tenderness.   Cardiovascular:      Rate and Rhythm: Normal rate and regular rhythm.      Pulses: Normal pulses.      Heart sounds: Normal heart sounds.   Pulmonary:      Effort: Pulmonary effort is normal. No respiratory distress.      Breath sounds: Normal breath sounds.   Abdominal:      General: Bowel sounds are normal. There is no distension.      Palpations: Abdomen is soft.      Tenderness: There is no abdominal tenderness. There is no guarding or rebound.   Musculoskeletal: Normal range of motion.          General: No tenderness or deformity.   Skin:     General: Skin is warm and dry.      Capillary Refill: Capillary refill takes less than 2 seconds.   Neurological:      General: No focal deficit present.      Mental Status: He is alert and oriented to person, place, and time.      Cranial Nerves: No cranial nerve deficit.      Motor: No weakness.   Psychiatric:         Mood and Affect: Mood normal.         ED Course     Brigida lens irrigation    Lab Results     No results found for this visit on 08/25/20.    EKG Results     EKG Interpretation. None.    EKG tracing interpreted by ED physician    Radiology Results     Imaging Results    None         ED Medication Orders (From admission, onward)    Ordered Start     Status Ordering Provider    08/25/20 1658 08/25/20 1700  proparacaine (ALCAINE) 0.5 % ophthalmic solution 2 drop  ONCE      Last MAR action: Given WILBERTO DIXON               [facility] h&p as above, patient with chemical exposure to b/l eyes, irrigated pta, tdap uptodate, blurred vision better after irrigation with 2L IVF via b/l brigida lenses, ph went from 9 and 10 to 7 b/l eyes. Injection has improved. Given erythromycin ointment as ppx, doesn't wear contacts. He has an ophthalmologist who he has already called and will follow up with tomorrow per his report. His symptoms largely resolved at this time. D/w him about red flags and reasons to return immediately.     Clinical Impression     ED Diagnosis   1. Chemical exposure of eye         Disposition        Discharge 8/25/2020  6:42 PM  Stew GRIFFIN Lulu discharge to home/self care.          Emma Weiland   8/25/2020 4:49 PM       Charting performed by ED Scribe Emma Weiland for Dr. Dixon.    I have reviewed the scribe's charting and agree that the final signed note accurately reflects my personal performance of the history, physical exam, hospital course, and assessment and plan.                Wilberto Dxion MD  08/25/20 5248     Negative

## 2020-11-18 NOTE — ED PROVIDER NOTE - NS ED ROS FT
Constitutional: No fever or chills  Eyes: No visual changes, eye pain or redness  HEENT: No throat pain, ear pain, nasal pain. No nose bleeding.  CV: +chest pain. No lower extremity edema  Resp: No SOB no cough  GI: No abd pain. No nausea or vomiting. No diarrhea. No constipation.   : No dysuria, hematuria.   MSK: No musculoskeletal pain  Skin: No rash  Neuro: No headache. No numbness or tingling. No weakness.

## 2020-11-18 NOTE — ED PROCEDURE NOTE - PROCEDURE ADDITIONAL DETAILS
POCUS: Emergency Department Focused Ultrasound performed at patient's bedside.  The complete report may be available in PACS.

## 2020-11-18 NOTE — ED PROVIDER NOTE - ATTENDING CONTRIBUTION TO CARE
Patient is a 46 yo F with no chronic medical problems here for evaluation of chest pressure x 3 days. Patient reports having high blood pressure which she discussed with her pcp and current plan was for monitoring it. She states that she has been checking it at home and recently it has been elevated to SBP > 160. She has noted midsternal, non-radiating chest pressure that is intermittent, worse at night and in the morning and with exertion. She usually uses the treadmill every morning and has not been doing so. Denies shortness of breath, nausea, vomiting, leg swelling. No travel. No fevers, chills.     VS noted  Gen. no acute distress, Non toxic   HEENT: EOMI, mmm  Lungs: CTAB/L no C/ W /R   CVS: RRR   Abd; Soft non tender, non distended, no CVA tenderness  Ext: no edema  Skin: no rash  Neuro AAOx3 non focal clear speech  a/p: chest pressure - ekg is w/o ischemic changes. plan for ACS work up. No prior stress/ cardiac work up. Will possibly CDU for CT Coronary/ echo.   - Kenney WILL Patient is a 46 yo F with no chronic medical problems here for evaluation of chest pressure x 3 days. Patient reports having high blood pressure which she discussed with her pcp and current plan was for monitoring it. She states that she has been checking it at home and recently it has been elevated to SBP > 160. She has noted midsternal, non-radiating chest pressure that is intermittent, worse at night and in the morning and with exertion. She usually uses the treadmill every morning and has not been doing so. Denies shortness of breath, nausea, vomiting, leg swelling. No travel. No fevers, chills.     VS noted  Gen. no acute distress, Non toxic   HEENT: EOMI, mmm  Lungs: CTAB/L no C/ W /R   CVS: RRR   Abd; Soft non tender, non distended, no CVA tenderness  Ext: no edema  Skin: no rash  Neuro AAOx3 non focal clear speech  a/p: chest pressure - ekg is w/o ischemic changes. plan for ACS work up. No prior stress/ cardiac work up. Will possibly CDU for stress.   - Kenney WILL

## 2020-11-18 NOTE — ED CDU PROVIDER DISPOSITION NOTE - NSFOLLOWUPCLINICS_GEN_ALL_ED_FT
[facility]  Gastroenterology  [address] [place] [id number]  Phone: [phone]  Fax:   Follow Up Time:

## 2020-11-18 NOTE — ED PROVIDER NOTE - PHYSICAL EXAMINATION
GEN: Well Appearing, Nontoxic, NAD  HEENT: NC/AT, Symm Facies. PERRL, EOMI, MMM, posterior pharynx clear  CV: No JVD/Bruits or stridor;  +S1S2, tachycardic, regular w/o m/g/r  RESP: CTAB w/o w/r/r  ABD: Soft, nt/nd, +BS. No guarding/rebound. No RUQ tender, no CVAT  EXT/MSK: No lower extremity edema or calf tenderness. WWP, palpable pulses. FROMx4  SKIN: No erythema, lesions or rash  Neuro: Grossly intact, AOX3 with normal speech, CN II-XII intact; Sensation intact, motor 5/5 throughout. Gait normal

## 2020-11-18 NOTE — ED PROVIDER NOTE - OBJECTIVE STATEMENT
46 y/o F newly diagnosed HTN not on any medications at this time presenting with chest pain and concern for high blood pressure this week. Patient states a few months ago she saw her PMD and in the office her BP was elevated, since that time he has been advising her to monitor her blood pressure at home but has not started any antihypertensives. States that she has been checking her BP at home the last 3 days and it has been 160s/100s. She reports that yesterday her BP was better 130/90, but states that on Sunday she developed slight pressure in her chest. States that this pressure has been becoming more significant since it started, feels like a pressure in the center of her chest, 'going out to the sides' but does not radiate anywhere else. Reports that she suffers from GERD and has reflux quite frequently, and that this does not feel anything like that. She states that she runs on the treadmill every morning and has not been able to do that the past few days because she feels the chest pressure increases/becomes more significant when she gets up to walk around. She denies any shortness of breath, sweating, nausea, recent illnesses, fever or chills, recent travel, leg swelling.

## 2020-11-18 NOTE — ED CDU PROVIDER DISPOSITION NOTE - PATIENT PORTAL LINK FT
You can access the FollowMyHealth Patient Portal offered by [facility] by registering at the following website: [email or web address]. By joining [employer]’s FollowMyHealth portal, you will also be able to view your health information using other applications (apps) compatible with our system.

## 2020-11-18 NOTE — ED CDU PROVIDER INITIAL DAY NOTE - OBJECTIVE STATEMENT
44 y/o F newly diagnosed HTN not on any medications at this time presenting with chest pain and concern for high blood pressure this week. Patient states a few months ago she saw her PMD and in the office her BP was elevated, since that time he has been advising her to monitor her blood pressure at home but has not started any antihypertensives. States that she has been checking her BP at home the last 3 days and it has been 160s/100s. She reports that yesterday her BP was better 130/90, but states that on Sunday she developed slight pressure in her chest. States that this pressure has been becoming more significant since it started, feels like a pressure in the center of her chest, 'going out to the sides' but does not radiate anywhere else. Reports that she suffers from GERD and has reflux quite frequently, and that this does not feel anything like that. She states that she runs on the treadmill every morning and has not been able to do that the past few days because she feels the chest pressure increases/becomes more significant when she gets up to walk around. She denies any shortness of breath, sweating, nausea, recent illnesses, fever or chills, recent travel, leg swelling.

## 2020-11-18 NOTE — ED ADULT NURSE REASSESSMENT NOTE - NS ED NURSE REASSESS COMMENT FT1
Pt d/c home home per PA Shima, VSS no complaints, IV locks removed, d/c paperwork given to pt by PA. Ambulated out of unit independently with own belongings left hospital via private car.

## 2020-11-18 NOTE — ED ADULT NURSE NOTE - OBJECTIVE STATEMENT
Pt is a 46y/o female A&Ox4 speaking coherently OOB independently w/ c/o chest pain. Pt states that she is currently experiencing midsternal chest pain that started a few days ago, has never experienced this before. Does not radiate anywhere. Describes it as a "pressure." Feels palpitations before the onset of the pain. Better at night and worse with waking up and activity. Currently constant regardless of position. Pt has been checking her BP regularly for the past few months as per her physician. Was high at her last appointment (around 160/100) so he told her to monitor it. Has been relatively similar to that the past few days. Not currently on any medications. No lower extremity swelling noted. Lungs clear on assessment. Pt states she had urgency and lower abdominal pain last month and OTC test came back positive for UTI. Pt was on abx and states she had relief. Denies any other medical hx. EKG done. Patient on cardiac monitor, NSR and sinus tachy noted. Denies headache, vision changes, shortness of breath, abdominal pain, nausea, vomiting, diarrhea, fevers, chills, dysuria, hematuria, recent illness travel or fall. Safety and comfort measures provided. Bed locked and in lowest position, siderails up for safety. Call bell within reach.

## 2020-11-18 NOTE — ED ADULT NURSE REASSESSMENT NOTE - NS ED NURSE REASSESS COMMENT FT1
07.00 Am Received the Pt from  MICHELLE Olivas . Pt is Observed for chest pain had nuclear stress test resulted Pending CDU  MD campos . Received the Pt A&OX 4 obeys commands Tiffani N/V/D fever chills cp SOB   Comfort care & safety measures continued  IV site looks clean & dry no signs of infiltration noted pt denies  pain IV site .  Pt is advised to call for help  call bell with in the reach pt verbalized the understanding .Pt is NSR 88  on cardiac monitor . Continue to monitor

## 2020-11-18 NOTE — ED ADULT NURSE REASSESSMENT NOTE - NS ED NURSE REASSESS COMMENT FT1
Patient resting comfortably in bed. Cardiac monitor on, indicating NSR. Patient has no current c/o chest pain, discomfort, or SOB. Verbalizes understanding of plan of care, to be transferred to CDU. All questions answered.

## 2021-01-08 ENCOUNTER — APPOINTMENT (OUTPATIENT)
Dept: OBGYN | Facility: CLINIC | Age: 46
End: 2021-01-08
Payer: COMMERCIAL

## 2021-01-08 VITALS
BODY MASS INDEX: 26.46 KG/M2 | WEIGHT: 155 LBS | HEIGHT: 64 IN | SYSTOLIC BLOOD PRESSURE: 128 MMHG | DIASTOLIC BLOOD PRESSURE: 90 MMHG

## 2021-01-08 DIAGNOSIS — N92.6 IRREGULAR MENSTRUATION, UNSPECIFIED: ICD-10-CM

## 2021-01-08 DIAGNOSIS — Z86.59 PERSONAL HISTORY OF OTHER MENTAL AND BEHAVIORAL DISORDERS: ICD-10-CM

## 2021-01-08 DIAGNOSIS — Z87.42 PERSONAL HISTORY OF OTHER DISEASES OF THE FEMALE GENITAL TRACT: ICD-10-CM

## 2021-01-08 DIAGNOSIS — Z00.00 ENCOUNTER FOR GENERAL ADULT MEDICAL EXAMINATION W/OUT ABNORMAL FINDINGS: ICD-10-CM

## 2021-01-08 DIAGNOSIS — O02.1 MISSED ABORTION: ICD-10-CM

## 2021-01-08 PROCEDURE — 99072 ADDL SUPL MATRL&STAF TM PHE: CPT

## 2021-01-08 PROCEDURE — 99396 PREV VISIT EST AGE 40-64: CPT

## 2021-01-08 RX ORDER — OXYCODONE AND ACETAMINOPHEN 5; 325 MG/1; MG/1
5-325 TABLET ORAL EVERY 4 HOURS
Qty: 15 | Refills: 0 | Status: COMPLETED | COMMUNITY
Start: 2020-06-12 | End: 2021-01-08

## 2021-01-08 RX ORDER — MISOPROSTOL 200 UG/1
200 TABLET ORAL
Qty: 12 | Refills: 0 | Status: COMPLETED | COMMUNITY
Start: 2020-06-12 | End: 2021-01-08

## 2021-01-08 NOTE — HISTORY OF PRESENT ILLNESS
[Patient reported mammogram was normal] : Patient reported mammogram was normal [Patient reported breast sonogram was normal] : Patient reported breast sonogram was normal [Patient reported PAP Smear was normal] : Patient reported PAP Smear was normal [FreeTextEntry1] : 46YO P2 LMP 12/20, 1 week early and associated with pain 1 week prior to menses. This is different than usual. Pt also had a medical Ab in June for missed Ab. Pt also with Hx of microdiscectomy in her lumbar spine. [Mammogramdate] : 1/20 [BreastSonogramDate] : 1/20 [PapSmeardate] : 11/19

## 2021-01-08 NOTE — PHYSICAL EXAM
[Appropriately responsive] : appropriately responsive [Alert] : alert [No Acute Distress] : no acute distress [No Lymphadenopathy] : no lymphadenopathy [Regular Rate Rhythm] : regular rate rhythm [No Murmurs] : no murmurs [Clear to Auscultation B/L] : clear to auscultation bilaterally [Soft] : soft [Non-tender] : non-tender [Non-distended] : non-distended [No HSM] : No HSM [No Lesions] : no lesions [No Mass] : no mass [Oriented x3] : oriented x3 [Examination Of The Breasts] : a normal appearance [No Masses] : no breast masses were palpable [Labia Majora] : normal [Labia Minora] : normal [Normal] : normal [Tenderness] : nontender [Retroversion] : retroverted [Uterine Adnexae] : normal [FreeTextEntry6] : ?posterior fibroid

## 2021-01-11 LAB — HPV HIGH+LOW RISK DNA PNL CVX: NOT DETECTED

## 2021-01-12 LAB — CYTOLOGY CVX/VAG DOC THIN PREP: NORMAL

## 2021-02-05 ENCOUNTER — APPOINTMENT (OUTPATIENT)
Dept: ULTRASOUND IMAGING | Facility: CLINIC | Age: 46
End: 2021-02-05
Payer: COMMERCIAL

## 2021-02-05 ENCOUNTER — OUTPATIENT (OUTPATIENT)
Dept: OUTPATIENT SERVICES | Facility: HOSPITAL | Age: 46
LOS: 1 days | End: 2021-02-05
Payer: COMMERCIAL

## 2021-02-05 ENCOUNTER — RESULT REVIEW (OUTPATIENT)
Age: 46
End: 2021-02-05

## 2021-02-05 ENCOUNTER — APPOINTMENT (OUTPATIENT)
Dept: MAMMOGRAPHY | Facility: CLINIC | Age: 46
End: 2021-02-05
Payer: COMMERCIAL

## 2021-02-05 DIAGNOSIS — K08.499 PARTIAL LOSS OF TEETH DUE TO OTHER SPECIFIED CAUSE, UNSPECIFIED CLASS: Chronic | ICD-10-CM

## 2021-02-05 DIAGNOSIS — Z00.8 ENCOUNTER FOR OTHER GENERAL EXAMINATION: ICD-10-CM

## 2021-02-05 PROCEDURE — 76641 ULTRASOUND BREAST COMPLETE: CPT

## 2021-02-05 PROCEDURE — 77063 BREAST TOMOSYNTHESIS BI: CPT | Mod: 26

## 2021-02-05 PROCEDURE — 77063 BREAST TOMOSYNTHESIS BI: CPT

## 2021-02-05 PROCEDURE — 76856 US EXAM PELVIC COMPLETE: CPT

## 2021-02-05 PROCEDURE — 77067 SCR MAMMO BI INCL CAD: CPT

## 2021-02-05 PROCEDURE — 76641 ULTRASOUND BREAST COMPLETE: CPT | Mod: 26,50

## 2021-02-05 PROCEDURE — 76856 US EXAM PELVIC COMPLETE: CPT | Mod: 26

## 2021-02-05 PROCEDURE — 77067 SCR MAMMO BI INCL CAD: CPT | Mod: 26

## 2021-02-12 ENCOUNTER — APPOINTMENT (OUTPATIENT)
Dept: ORTHOPEDIC SURGERY | Facility: CLINIC | Age: 46
End: 2021-02-12
Payer: COMMERCIAL

## 2021-02-12 VITALS — TEMPERATURE: 97.9 F

## 2021-02-12 DIAGNOSIS — M54.16 RADICULOPATHY, LUMBAR REGION: ICD-10-CM

## 2021-02-12 PROCEDURE — 99072 ADDL SUPL MATRL&STAF TM PHE: CPT

## 2021-02-12 PROCEDURE — 99214 OFFICE O/P EST MOD 30 MIN: CPT

## 2021-02-22 ENCOUNTER — APPOINTMENT (OUTPATIENT)
Dept: MRI IMAGING | Facility: CLINIC | Age: 46
End: 2021-02-22
Payer: COMMERCIAL

## 2021-02-22 ENCOUNTER — RESULT REVIEW (OUTPATIENT)
Age: 46
End: 2021-02-22

## 2021-02-22 ENCOUNTER — OUTPATIENT (OUTPATIENT)
Dept: OUTPATIENT SERVICES | Facility: HOSPITAL | Age: 46
LOS: 1 days | End: 2021-02-22
Payer: COMMERCIAL

## 2021-02-22 DIAGNOSIS — K08.499 PARTIAL LOSS OF TEETH DUE TO OTHER SPECIFIED CAUSE, UNSPECIFIED CLASS: Chronic | ICD-10-CM

## 2021-02-22 DIAGNOSIS — M54.12 RADICULOPATHY, CERVICAL REGION: ICD-10-CM

## 2021-02-22 DIAGNOSIS — M54.16 RADICULOPATHY, LUMBAR REGION: ICD-10-CM

## 2021-02-22 PROCEDURE — 72148 MRI LUMBAR SPINE W/O DYE: CPT

## 2021-02-22 PROCEDURE — 72148 MRI LUMBAR SPINE W/O DYE: CPT | Mod: 26

## 2021-02-25 ENCOUNTER — APPOINTMENT (OUTPATIENT)
Dept: ORTHOPEDIC SURGERY | Facility: CLINIC | Age: 46
End: 2021-02-25
Payer: COMMERCIAL

## 2021-02-25 DIAGNOSIS — Q76.2 CONGENITAL SPONDYLOLISTHESIS: ICD-10-CM

## 2021-02-25 DIAGNOSIS — M43.16 SPONDYLOLISTHESIS, LUMBAR REGION: ICD-10-CM

## 2021-02-25 PROCEDURE — 99214 OFFICE O/P EST MOD 30 MIN: CPT

## 2021-02-25 PROCEDURE — 99072 ADDL SUPL MATRL&STAF TM PHE: CPT

## 2021-02-25 NOTE — ADDENDUM
[FreeTextEntry1] : This note was authored by Karla Jeong working as a medical scribe for Dr. Casey Christianson. The note was reviewed, edited, and revised by Dr. Casey Christianson whom is in agreement with the exam findings, imaging findings, and treatment plan. 02/25/2021.

## 2021-02-25 NOTE — PHYSICAL EXAM
[Normal] : Gait: normal [Landry's Sign] : negative Landry's sign [Pronator Drift] : negative pronator drift [SLR] : negative straight leg raise [de-identified] : 5 out of 5 motor strength, sensation is intact and symmetrical full range of motion flexion extension and rotation, no palpatory tenderness full range of motion of hips knees shoulders and elbows (all four extremities), no atrophy, negative straight leg raise, no pathological reflexes, no swelling, normal ambulation, no apparent distress skin is intact, no palpable lymph nodes, no upper or lower extremity instability, alert and oriented x3 and normal mood. Normal finger-to nose test.  [de-identified] : MR SPINE LUMBAR   02/22/2021  (PACS) \par \par FINDINGS: Conus terminates at the L1 level and is normal in signal. Vertebral body heights are maintained. There is grade 1 anterolisthesis at L5-S1. Alignment is otherwise normal. There is disc degeneration and severe disc height loss at L5-S1 which is slightly progressed. There are mild Modic type I endplate changes at L5-S1.\par \par T12-L1, L1-L2, L2-L3: No bulging or herniated intervertebral discs. No spinal canal stenosis or foraminal narrowing.\par \par L3-L4: Minimal disc bulge. No spinal canal stenosis or foraminal narrowing.\par \par L4-5: No bulging or herniated intervertebral disc. No spinal canal stenosis or foraminal narrowing.\par \par L5-S1: Interval right hemilaminectomy and resolution of previously noted right paracentral herniated disc material, consistent with discectomy. No evidence of a recurrent disc herniation. Grade 1 anterolisthesis secondary to chronic bilateral L5 pars defects. Circumferential disc bulge with osseous ridging. Moderate to severe bilateral foraminal narrowing.\par \par The imaged portions of the sacroiliac joints are unremarkable.\par \par There is redemonstrated partially imaged thickening of the junctional zone of the uterus, consistent with adenomyosis\par \par IMPRESSION: L5-S1: Interval right hemilaminectomy and right paracentral discectomy. No evidence of a recurrent disc herniation. Grade 1 anterolisthesis secondary to chronic bilateral L5 pars defects. Circumferential disc bulge with osseous ridging. Moderate to severe bilateral foraminal narrowing.\par \par \par \par Prior visit - AP/lat lumbar-adequate\par AP/lat cervical-mild degenerative changes-reviewed with patient.

## 2021-02-25 NOTE — DISCUSSION/SUMMARY
[de-identified] : Lumbar lami/disc left leg by Dr. Reyes Jan 2018.\par Lumbar radiculopathy.\par L5-S1 spondylolisthesis.\par Feeling better.\par Discussed all options. \par Continue Diclofenac.\par F/u prn. \par All options discussed including rest, medicine, home exercise, acupuncture, Chiropractic care, Physical Therapy, Pain management, and last resort surgery. All questions were answered, all alternatives discussed and the patient is in complete agreement with that plan. Follow-up appointment as instructed. Any issues and the patient will call or come in sooner.

## 2021-02-25 NOTE — HISTORY OF PRESENT ILLNESS
[Improving] : improving [de-identified] : 45 year female presents for evaluation of lower back pain x several months. \rashid Presents today for MRI Lumbar review. Feeling better now.\par Lumbar lami/disc left leg by Dr. Reyes Jan 2018.\par Last seen in Oct 2019 for neck and lower back pain, referred to neurology.\par She states that pain radiates down B/L legs to the feet, L>R. \par Also has numbness/tingling of B/L LE.\par Sitting and bending aggravates the pain.\par Heat alleviates the pain. \par Has tried tylenol and motrin and has mild relief. \par Has not participated with PT or chiropractic care for current issue.\par Denies any recent epidurals. \par No fever chills sweats nausea vomiting no bowel or bladder dysfunction, no recent weight loss or gain no night pain. This history is in addition to the intake form that I personally reviewed.

## 2021-03-03 ENCOUNTER — RESULT REVIEW (OUTPATIENT)
Age: 46
End: 2021-03-03

## 2021-03-03 ENCOUNTER — OUTPATIENT (OUTPATIENT)
Dept: OUTPATIENT SERVICES | Facility: HOSPITAL | Age: 46
LOS: 1 days | End: 2021-03-03
Payer: COMMERCIAL

## 2021-03-03 ENCOUNTER — APPOINTMENT (OUTPATIENT)
Dept: ULTRASOUND IMAGING | Facility: CLINIC | Age: 46
End: 2021-03-03
Payer: COMMERCIAL

## 2021-03-03 DIAGNOSIS — K08.499 PARTIAL LOSS OF TEETH DUE TO OTHER SPECIFIED CAUSE, UNSPECIFIED CLASS: Chronic | ICD-10-CM

## 2021-03-03 DIAGNOSIS — Z00.8 ENCOUNTER FOR OTHER GENERAL EXAMINATION: ICD-10-CM

## 2021-03-03 PROCEDURE — 76642 ULTRASOUND BREAST LIMITED: CPT | Mod: 26,RT

## 2021-03-03 PROCEDURE — 76642 ULTRASOUND BREAST LIMITED: CPT

## 2021-03-08 ENCOUNTER — APPOINTMENT (OUTPATIENT)
Dept: OBGYN | Facility: CLINIC | Age: 46
End: 2021-03-08

## 2021-03-21 ENCOUNTER — RX RENEWAL (OUTPATIENT)
Age: 46
End: 2021-03-21

## 2021-04-05 ENCOUNTER — NON-APPOINTMENT (OUTPATIENT)
Age: 46
End: 2021-04-05

## 2021-04-07 ENCOUNTER — RX RENEWAL (OUTPATIENT)
Age: 46
End: 2021-04-07

## 2021-04-07 NOTE — ASU DISCHARGE PLAN (ADULT/PEDIATRIC). - =======================================================================
Patient given above information  Patient is going out of town from April 24 to May 10th  He is hoping to get in before he goes  There is nothing available    Please advise if okay to place him on the schedule at the 4 week time frame Statement Selected

## 2021-04-12 ENCOUNTER — NON-APPOINTMENT (OUTPATIENT)
Age: 46
End: 2021-04-12

## 2021-04-13 ENCOUNTER — APPOINTMENT (OUTPATIENT)
Dept: OBGYN | Facility: CLINIC | Age: 46
End: 2021-04-13
Payer: COMMERCIAL

## 2021-04-13 ENCOUNTER — NON-APPOINTMENT (OUTPATIENT)
Age: 46
End: 2021-04-13

## 2021-04-13 VITALS — SYSTOLIC BLOOD PRESSURE: 128 MMHG | DIASTOLIC BLOOD PRESSURE: 80 MMHG

## 2021-04-13 VITALS — TEMPERATURE: 97.5 F

## 2021-04-13 PROCEDURE — 99213 OFFICE O/P EST LOW 20 MIN: CPT

## 2021-04-13 PROCEDURE — 99072 ADDL SUPL MATRL&STAF TM PHE: CPT

## 2021-04-13 NOTE — PHYSICAL EXAM
[Discharge] : a  ~M vaginal discharge was present [Moderate] : moderate [Purulent] : purulent [FreeTextEntry1] : Vulva swollen, beefy red, indurated

## 2021-04-13 NOTE — HISTORY OF PRESENT ILLNESS
[FreeTextEntry1] : 46YO P2 LMP 4/4 developed right labial pain after using cheap wipes. She stopped using the wipes and swelling decreased a bit but recurred, waxing and waning until now where everything is red and swollen and very painful.

## 2021-04-16 LAB
BACTERIA GENITAL AEROBE CULT: NORMAL
CANDIDA VAG CYTO: NOT DETECTED
G VAGINALIS+PREV SP MTYP VAG QL MICRO: NOT DETECTED
T VAGINALIS VAG QL WET PREP: NOT DETECTED

## 2021-04-17 ENCOUNTER — EMERGENCY (EMERGENCY)
Facility: HOSPITAL | Age: 46
LOS: 1 days | Discharge: HOME | End: 2021-04-17
Attending: EMERGENCY MEDICINE
Payer: COMMERCIAL

## 2021-04-17 VITALS
HEIGHT: 64 IN | WEIGHT: 149.91 LBS | TEMPERATURE: 99 F | OXYGEN SATURATION: 98 % | RESPIRATION RATE: 18 BRPM | SYSTOLIC BLOOD PRESSURE: 159 MMHG | DIASTOLIC BLOOD PRESSURE: 107 MMHG | HEART RATE: 99 BPM

## 2021-04-17 VITALS
HEART RATE: 82 BPM | OXYGEN SATURATION: 100 % | RESPIRATION RATE: 16 BRPM | DIASTOLIC BLOOD PRESSURE: 90 MMHG | SYSTOLIC BLOOD PRESSURE: 149 MMHG | TEMPERATURE: 98 F

## 2021-04-17 DIAGNOSIS — K08.499 PARTIAL LOSS OF TEETH DUE TO OTHER SPECIFIED CAUSE, UNSPECIFIED CLASS: Chronic | ICD-10-CM

## 2021-04-17 PROCEDURE — 99284 EMERGENCY DEPT VISIT MOD MDM: CPT

## 2021-04-17 PROCEDURE — 99284 EMERGENCY DEPT VISIT MOD MDM: CPT | Mod: 25

## 2021-04-17 RX ORDER — FLUCONAZOLE 150 MG/1
150 TABLET ORAL ONCE
Refills: 0 | Status: COMPLETED | OUTPATIENT
Start: 2021-04-17 | End: 2021-04-17

## 2021-04-17 RX ORDER — HYDROCORTISONE 1 %
1 OINTMENT (GRAM) TOPICAL
Refills: 0 | Status: DISCONTINUED | OUTPATIENT
Start: 2021-04-17 | End: 2021-04-21

## 2021-04-17 RX ADMIN — Medication 1 APPLICATION(S): at 17:59

## 2021-04-17 RX ADMIN — FLUCONAZOLE 150 MILLIGRAM(S): 150 TABLET ORAL at 17:58

## 2021-04-17 NOTE — ED PROVIDER NOTE - NS ED ROS FT
CONST: no fevers, + chills, no trauma  EYES: no pain, no blurry vision   ENT: no sore throat, no epistaxis, no rhinorrhea  CV: no chest pain, no palpitations, no orthopnea, no extremity pain or swelling  RESP: no shortness of breath, no cough, no sputum, no pleurisy, no wheezing  ABD: no abdominal pain, no nausea, no vomiting, no diarrhea, no black or bloody stool  : no dysuria, no hematuria, no frequency, no urgency  MSK: no back pain, no neck pain, no extremity pain  NEURO: no headache, no sensory disturbances, no focal weakness, no dizziness  HEME: no easy bleeding or bruising  SKIN: no diaphoresis, + rash

## 2021-04-17 NOTE — ED PROVIDER NOTE - CLINICAL SUMMARY MEDICAL DECISION MAKING FREE TEXT BOX
45F presenting for rash w concern for cellulitis. On exam, VSS (afebrile) - affected area erythematous but without cellulitic changes. Ddx: Possible dermatitis vs partially treated cellulitis. Given no systemic symptoms, and original area improving ok to continue keflex. Will provide topical steroid and Diflucan given pts concern for developing yeast infection on other meds. RUSSELL Brown PGY2

## 2021-04-17 NOTE — ED ADULT NURSE NOTE - SUICIDE SCREENING QUESTION 3
Call placed to patient's daughter Frances.   Relayed Dr. Borja message.   Patient states she re-checked patient's temp and temperature was 98.9 temporal.     No further questions/concerns.   Patients daughter verbalizes understanding and agrees with plan.     Jason Onofre RN     No

## 2021-04-17 NOTE — ED ADULT TRIAGE NOTE - ESI TRIAGE ACUITY LEVEL, MLM
Chief Complaint   Patient presents with   • Routine Prenatal Visit     No complaints       HPI: Glen is a  currently at 29w5d who today reports the following:  Contractions - No; Leaking - No; Vaginal bleeding -  No; Heartburn - No.    ROS:  GI: Nausea - No ; Constipation - No; Diarrhea - No    Neuro: Headache - No ; Visual change - No      EXAM:  Vitals: See prenatal flowsheet   Abdomen: See prenatal flowsheet   Urine glucose/protein: See prenatal flowsheet   Pelvic: See prenatal flowsheet     Lab Results   Component Value Date    ABO AB 2017    RH Positive 2017    ABSCRN Negative 2017       MDM:  Impression: 1. Supervision of low risk pregnancy   Tests done today: 1. none   Topics discussed: 1. Continue with PNV's  2. Prenatal labs reviewed  3. weight loss   Tests next visit: none   Next visit: See prenatal flowsheet        3

## 2021-04-17 NOTE — ED PROVIDER NOTE - OBJECTIVE STATEMENT
45F pmhx of HTN, not diabetic presenting to the ER for CC of erythema of the inner thighs. Patient states that she started having pain/pruritis and erythema over the vagina about 1 week ago. Inciting factor - new wipes she was using. Gynecologist diagnosed her with cellulitis and started her on Keflex - 10 day course, currently skilled nursing done with the meds. Taking abx as prescribed but now has same sensation spreading to inner thighs - area over the vagina ia improving. +chills last night. No f/n/v, no vaginal discharge, urinary complaints. 45F pmhx of HTN, not diabetic presenting to the ER for CC of erythema of the inner thighs. Patient states that she started having pain/pruritis and erythema over the vagina about 1 week ago. Inciting factor - new wipes she was using. Gynecologist diagnosed her with cellulitis and started her on Keflex - 10 day course, currently skilled nursing done with the meds. Taking abx as prescribed but now has same sensation spreading to inner thighs - area over the vagina ia improving. +chills last night. No f/n/v, no vaginal discharge, urinary complaints.    Attendinyo female presents with redness to inner thighs.  has itching to the area.  redness over the vaginal area has improved with keflex.  no fever/chills.

## 2021-04-17 NOTE — ED PROVIDER NOTE - PATIENT PORTAL LINK FT
You can access the FollowMyHealth Patient Portal offered by [facility] by registering at the following website: [email or web address]. By joining [employer]’s FollowMyHealth portal, you will also be able to view your health information using other applications (apps) compatible with our system.

## 2021-04-17 NOTE — ED ADULT NURSE NOTE - NSIMPLEMENTINTERV_GEN_ALL_ED
Implemented All Universal Safety Interventions:  [place] to call system. Call bell, personal items and telephone within reach. Instruct patient to call for assistance. Room bathroom lighting operational. Non-slip footwear when patient is off stretcher. Physically safe environment: no spills, clutter or unnecessary equipment. Stretcher in lowest position, wheels locked, appropriate side rails in place.

## 2021-04-17 NOTE — ED PROVIDER NOTE - NSFOLLOWUPINSTRUCTIONS_ED_ALL_ED_FT
Please complete your antibiotics as directed by your gynecologist.     Return to the ER immediately for new or worsening pain/redness, fevers, vomiting, or any other concerning symptoms.     Apply the topical cream you were given today as directed.     Follow up your doctor in 7-10 days.

## 2021-04-17 NOTE — ED ADULT NURSE NOTE - OBJECTIVE STATEMENT
Female 45 years old with past medical history of HTN, alert and orientedx4 came in for vaginal cellulitis. Pt reports she had vaginal swelling, redness and itching onset last Friday, after using "cheap wipes"3 days after she was seen by her Gyne and was started on Cephalexin 500mg 4x daily for 10 days. Three days after she feels better, but today she noticed swelling and redness at bilateral inner thigh. Reports chills and nausea last night. Denies urinary symptoms. Evaluated by MD. Will reassess.

## 2021-04-17 NOTE — ED PROVIDER NOTE - PHYSICAL EXAMINATION
Const: Well-nourished, Well-developed, appearing stated age.  Eyes: no conjunctival injection, and symmetrical lids.  HEENT: Head NCAT, no lesions. Atraumatic external nose and ears. Moist MM.  Neck: Symmetric, trachea midline.   CVS: +S1/S2, Peripheral pulses 2+ and equal in all extremities.  RESP: Unlabored respiratory effort. Clear to auscultation bilaterally.  GI: Nontender/Nondistended, No CVA tenderness b/l.   MSK: Normocephalic/Atraumatic, Lower Extremities w/o calf tenderness or edema b/l.   Skin: Warm, dry and intact.   Neuro: CNs II-XII grossly intact. Motor & Sensation grossly intact.  Psych: Awake, Alert, & Oriented (AAO) x3. Appropriate mood and affect.    Vaginal exam with nurse Richelle: +mild erythema of skin over vagina and surrounding groin. Not edematous or warm to touch. No fluctuance/induration palpable.

## 2021-05-27 ENCOUNTER — APPOINTMENT (OUTPATIENT)
Dept: SURGICAL ONCOLOGY | Facility: CLINIC | Age: 46
End: 2021-05-27
Payer: COMMERCIAL

## 2021-05-27 VITALS
BODY MASS INDEX: 25.27 KG/M2 | SYSTOLIC BLOOD PRESSURE: 130 MMHG | RESPIRATION RATE: 16 BRPM | HEART RATE: 75 BPM | OXYGEN SATURATION: 98 % | HEIGHT: 64 IN | TEMPERATURE: 98.1 F | DIASTOLIC BLOOD PRESSURE: 90 MMHG | WEIGHT: 148 LBS

## 2021-05-27 PROCEDURE — 99204 OFFICE O/P NEW MOD 45 MIN: CPT

## 2021-05-27 PROCEDURE — 99072 ADDL SUPL MATRL&STAF TM PHE: CPT

## 2021-06-09 ENCOUNTER — OUTPATIENT (OUTPATIENT)
Dept: OUTPATIENT SERVICES | Facility: HOSPITAL | Age: 46
LOS: 1 days | End: 2021-06-09
Payer: COMMERCIAL

## 2021-06-09 DIAGNOSIS — C43.72 MALIGNANT MELANOMA OF LEFT LOWER LIMB, INCLUDING HIP: ICD-10-CM

## 2021-06-09 DIAGNOSIS — K08.499 PARTIAL LOSS OF TEETH DUE TO OTHER SPECIFIED CAUSE, UNSPECIFIED CLASS: Chronic | ICD-10-CM

## 2021-06-10 ENCOUNTER — RESULT REVIEW (OUTPATIENT)
Age: 46
End: 2021-06-10

## 2021-06-10 PROCEDURE — 88321 CONSLTJ&REPRT SLD PREP ELSWR: CPT

## 2021-06-11 LAB — SURGICAL PATHOLOGY STUDY: SIGNIFICANT CHANGE UP

## 2021-06-24 ENCOUNTER — APPOINTMENT (OUTPATIENT)
Dept: PLASTIC SURGERY | Facility: CLINIC | Age: 46
End: 2021-06-24
Payer: COMMERCIAL

## 2021-06-24 VITALS
HEIGHT: 64 IN | SYSTOLIC BLOOD PRESSURE: 168 MMHG | BODY MASS INDEX: 24.92 KG/M2 | OXYGEN SATURATION: 100 % | HEART RATE: 99 BPM | WEIGHT: 146 LBS | DIASTOLIC BLOOD PRESSURE: 94 MMHG | TEMPERATURE: 98.6 F

## 2021-06-24 PROCEDURE — 99204 OFFICE O/P NEW MOD 45 MIN: CPT

## 2021-06-24 PROCEDURE — 99072 ADDL SUPL MATRL&STAF TM PHE: CPT

## 2021-06-25 NOTE — CONSULT LETTER
[Dear  ___] : Dear  [unfilled], [Consult Letter:] : I had the pleasure of evaluating your patient, [unfilled]. [Please see my note below.] : Please see my note below. [Consult Closing:] : Thank you very much for allowing me to participate in the care of this patient.  If you have any questions, please do not hesitate to contact me. [Sincerely,] : Sincerely, [FreeTextEntry3] : Tez Bingham MD, FACS

## 2021-07-06 ENCOUNTER — OUTPATIENT (OUTPATIENT)
Dept: OUTPATIENT SERVICES | Facility: HOSPITAL | Age: 46
LOS: 1 days | End: 2021-07-06
Payer: COMMERCIAL

## 2021-07-06 VITALS
TEMPERATURE: 98 F | RESPIRATION RATE: 16 BRPM | HEART RATE: 87 BPM | OXYGEN SATURATION: 100 % | WEIGHT: 143.08 LBS | DIASTOLIC BLOOD PRESSURE: 84 MMHG | SYSTOLIC BLOOD PRESSURE: 134 MMHG | HEIGHT: 64 IN

## 2021-07-06 DIAGNOSIS — C43.72 MALIGNANT MELANOMA OF LEFT LOWER LIMB, INCLUDING HIP: ICD-10-CM

## 2021-07-06 DIAGNOSIS — K08.499 PARTIAL LOSS OF TEETH DUE TO OTHER SPECIFIED CAUSE, UNSPECIFIED CLASS: Chronic | ICD-10-CM

## 2021-07-06 DIAGNOSIS — Z98.890 OTHER SPECIFIED POSTPROCEDURAL STATES: Chronic | ICD-10-CM

## 2021-07-06 LAB
ALBUMIN SERPL ELPH-MCNC: 4.8 G/DL — SIGNIFICANT CHANGE UP (ref 3.3–5)
ALP SERPL-CCNC: 91 U/L — SIGNIFICANT CHANGE UP (ref 40–120)
ALT FLD-CCNC: 20 U/L — SIGNIFICANT CHANGE UP (ref 4–33)
ANION GAP SERPL CALC-SCNC: 14 MMOL/L — SIGNIFICANT CHANGE UP (ref 7–14)
AST SERPL-CCNC: 17 U/L — SIGNIFICANT CHANGE UP (ref 4–32)
BILIRUB SERPL-MCNC: 0.4 MG/DL — SIGNIFICANT CHANGE UP (ref 0.2–1.2)
BLD GP AB SCN SERPL QL: NEGATIVE — SIGNIFICANT CHANGE UP
BUN SERPL-MCNC: 11 MG/DL — SIGNIFICANT CHANGE UP (ref 7–23)
CALCIUM SERPL-MCNC: 10 MG/DL — SIGNIFICANT CHANGE UP (ref 8.4–10.5)
CHLORIDE SERPL-SCNC: 102 MMOL/L — SIGNIFICANT CHANGE UP (ref 98–107)
CO2 SERPL-SCNC: 24 MMOL/L — SIGNIFICANT CHANGE UP (ref 22–31)
CREAT SERPL-MCNC: 0.73 MG/DL — SIGNIFICANT CHANGE UP (ref 0.5–1.3)
GLUCOSE SERPL-MCNC: 100 MG/DL — HIGH (ref 70–99)
HCG UR QL: NEGATIVE — SIGNIFICANT CHANGE UP
HCT VFR BLD CALC: 44.7 % — SIGNIFICANT CHANGE UP (ref 34.5–45)
HGB BLD-MCNC: 14.6 G/DL — SIGNIFICANT CHANGE UP (ref 11.5–15.5)
MCHC RBC-ENTMCNC: 28.8 PG — SIGNIFICANT CHANGE UP (ref 27–34)
MCHC RBC-ENTMCNC: 32.7 GM/DL — SIGNIFICANT CHANGE UP (ref 32–36)
MCV RBC AUTO: 88.2 FL — SIGNIFICANT CHANGE UP (ref 80–100)
NRBC # BLD: 0 /100 WBCS — SIGNIFICANT CHANGE UP
NRBC # FLD: 0 K/UL — SIGNIFICANT CHANGE UP
PLATELET # BLD AUTO: 348 K/UL — SIGNIFICANT CHANGE UP (ref 150–400)
POTASSIUM SERPL-MCNC: 3.7 MMOL/L — SIGNIFICANT CHANGE UP (ref 3.5–5.3)
POTASSIUM SERPL-SCNC: 3.7 MMOL/L — SIGNIFICANT CHANGE UP (ref 3.5–5.3)
PROT SERPL-MCNC: 8.1 G/DL — SIGNIFICANT CHANGE UP (ref 6–8.3)
RBC # BLD: 5.07 M/UL — SIGNIFICANT CHANGE UP (ref 3.8–5.2)
RBC # FLD: 12.8 % — SIGNIFICANT CHANGE UP (ref 10.3–14.5)
RH IG SCN BLD-IMP: POSITIVE — SIGNIFICANT CHANGE UP
SODIUM SERPL-SCNC: 140 MMOL/L — SIGNIFICANT CHANGE UP (ref 135–145)
WBC # BLD: 4.69 K/UL — SIGNIFICANT CHANGE UP (ref 3.8–10.5)
WBC # FLD AUTO: 4.69 K/UL — SIGNIFICANT CHANGE UP (ref 3.8–10.5)

## 2021-07-06 PROCEDURE — 93010 ELECTROCARDIOGRAM REPORT: CPT

## 2021-07-06 RX ORDER — AMLODIPINE BESYLATE 2.5 MG/1
1 TABLET ORAL
Qty: 0 | Refills: 0 | DISCHARGE

## 2021-07-06 RX ORDER — SODIUM CHLORIDE 9 MG/ML
1000 INJECTION, SOLUTION INTRAVENOUS
Refills: 0 | Status: DISCONTINUED | OUTPATIENT
Start: 2021-07-20 | End: 2021-08-03

## 2021-07-06 NOTE — H&P PST ADULT - ASSESSMENT
Problem: malignant melanoma of left lower limb, including hip   Assessment and Plan: Pt is tentatively scheduled for wide excision melanoma of left thigh for 7/20/21. Pre-op instructions provided. Pt given verbal and written instructions with teach back on chlorhexidine shampoo and pepcid. Pt strongly advised to follow up with surgeon to discuss COVID testing requirements prior to procedure. Urine cup provided for day of procedure pregnancy test. Pt verbalized understanding with return demonstration.     Problem: HTN  Assessment and Plan: Pt instructed to take amlodipine the evening prior to procedure.     Copy of stress test 11/2020 in chart.

## 2021-07-06 NOTE — H&P PST ADULT - NSICDXPASTSURGICALHX_GEN_ALL_CORE_FT
PAST SURGICAL HISTORY:  H/O wisdom tooth extraction     History of back surgery L5-S1 microdiscectomy 2018

## 2021-07-06 NOTE — H&P PST ADULT - NSICDXPASTMEDICALHX_GEN_ALL_CORE_FT
PAST MEDICAL HISTORY:  GERD (gastroesophageal reflux disease)     HTN (hypertension)     Malignant melanoma left thigh    Spondylolisthesis at L5-S1 level s/p surgery 2018

## 2021-07-06 NOTE — H&P PST ADULT - ATTENDING COMMENTS
46-year-old lady with a 0.6 mm melanoma of the upper, posterior left thigh.  She is scheduled for excision, with plastics closure (Dr. Tez Bingham).    Discussed with her in my office, and again on morning of operation.    All questions answered, consent the chart.

## 2021-07-06 NOTE — H&P PST ADULT - HISTORY OF PRESENT ILLNESS
46 year old female presents to presurgical testing with diagnosis of malignant melanoma of left lower limb, including hip scheduled for wide excision melanoma of left thigh. Pt reports left thigh lesion present to a couple of years, biopsy demonstrating malignant melanoma.

## 2021-07-14 PROBLEM — C43.9 MALIGNANT MELANOMA OF SKIN, UNSPECIFIED: Chronic | Status: ACTIVE | Noted: 2021-07-06

## 2021-07-14 PROBLEM — M43.17 SPONDYLOLISTHESIS, LUMBOSACRAL REGION: Chronic | Status: ACTIVE | Noted: 2018-01-26

## 2021-07-14 PROBLEM — I10 ESSENTIAL (PRIMARY) HYPERTENSION: Chronic | Status: ACTIVE | Noted: 2021-07-06

## 2021-07-14 PROBLEM — K21.9 GASTRO-ESOPHAGEAL REFLUX DISEASE WITHOUT ESOPHAGITIS: Chronic | Status: ACTIVE | Noted: 2021-07-06

## 2021-07-19 ENCOUNTER — TRANSCRIPTION ENCOUNTER (OUTPATIENT)
Age: 46
End: 2021-07-19

## 2021-07-19 NOTE — ASU PATIENT PROFILE, ADULT - PMH
GERD (gastroesophageal reflux disease)    HTN (hypertension)    Malignant melanoma  left thigh  Spondylolisthesis at L5-S1 level  s/p surgery 2018

## 2021-07-20 ENCOUNTER — RESULT REVIEW (OUTPATIENT)
Age: 46
End: 2021-07-20

## 2021-07-20 ENCOUNTER — APPOINTMENT (OUTPATIENT)
Dept: PLASTIC SURGERY | Facility: HOSPITAL | Age: 46
End: 2021-07-20
Payer: COMMERCIAL

## 2021-07-20 ENCOUNTER — NON-APPOINTMENT (OUTPATIENT)
Age: 46
End: 2021-07-20

## 2021-07-20 ENCOUNTER — APPOINTMENT (OUTPATIENT)
Dept: SURGICAL ONCOLOGY | Facility: HOSPITAL | Age: 46
End: 2021-07-20

## 2021-07-20 ENCOUNTER — OUTPATIENT (OUTPATIENT)
Dept: OUTPATIENT SERVICES | Facility: HOSPITAL | Age: 46
LOS: 1 days | Discharge: HOME | End: 2021-07-20
Payer: COMMERCIAL

## 2021-07-20 VITALS
DIASTOLIC BLOOD PRESSURE: 82 MMHG | RESPIRATION RATE: 15 BRPM | SYSTOLIC BLOOD PRESSURE: 131 MMHG | HEART RATE: 96 BPM | OXYGEN SATURATION: 100 %

## 2021-07-20 VITALS
HEART RATE: 101 BPM | HEIGHT: 64 IN | RESPIRATION RATE: 16 BRPM | OXYGEN SATURATION: 100 % | SYSTOLIC BLOOD PRESSURE: 164 MMHG | DIASTOLIC BLOOD PRESSURE: 100 MMHG | WEIGHT: 143.08 LBS | TEMPERATURE: 97 F

## 2021-07-20 DIAGNOSIS — K08.499 PARTIAL LOSS OF TEETH DUE TO OTHER SPECIFIED CAUSE, UNSPECIFIED CLASS: Chronic | ICD-10-CM

## 2021-07-20 DIAGNOSIS — C43.72 MALIGNANT MELANOMA OF LEFT LOWER LIMB, INCLUDING HIP: ICD-10-CM

## 2021-07-20 DIAGNOSIS — Z98.890 OTHER SPECIFIED POSTPROCEDURAL STATES: Chronic | ICD-10-CM

## 2021-07-20 LAB — HCG UR QL: NEGATIVE — SIGNIFICANT CHANGE UP

## 2021-07-20 PROCEDURE — 14301 TIS TRNFR ANY 30.1-60 SQ CM: CPT

## 2021-07-20 PROCEDURE — 11606 EXC TR-EXT MAL+MARG >4 CM: CPT

## 2021-07-20 PROCEDURE — 88305 TISSUE EXAM BY PATHOLOGIST: CPT | Mod: 26

## 2021-07-20 RX ORDER — ACETAMINOPHEN 500 MG
2 TABLET ORAL
Qty: 0 | Refills: 0 | DISCHARGE

## 2021-07-20 RX ORDER — MAGNESIUM OXIDE/MAG AA CHELATE 133 MG
100 TABLET ORAL
Qty: 0 | Refills: 0 | DISCHARGE

## 2021-07-20 RX ORDER — AMLODIPINE BESYLATE 2.5 MG/1
1 TABLET ORAL
Qty: 0 | Refills: 0 | DISCHARGE

## 2021-07-20 NOTE — ASU DISCHARGE PLAN (ADULT/PEDIATRIC) - ACTIVITY LEVEL
No excercise/No heavy lifting/No sports/gym/Elevate extremity Please minimize activity of the left leg./No excercise/No heavy lifting/No sports/gym/Elevate extremity

## 2021-07-20 NOTE — ASU DISCHARGE PLAN (ADULT/PEDIATRIC) - ASU DC SPECIAL INSTRUCTIONSFT
Initial followup with plastic surgery in the next 5-15 days, regarding matters of bandages, activities, and showering.    Dr. Stark should call with pathology report in approximately 2 weeks.  The conversation will determine further management. Initial followup with plastic surgery in the next 5-15 days, regarding matters of bandages, activities, and showering.    You may shower after 24 hours, but please do not remove the dressing. Please keep the dressing on until your appointment with Dr. Bingham.    Dr. Stark should call with pathology report in approximately 2 weeks.  The conversation will determine further management. Initial followup with plastic surgery in the next 5-15 days, regarding matters of bandages, activities, and showering.    Please follow up with Dr. Bingham within one week.    Pain medication prescriptions have been sent to your pharmacy. Please take this medication as directed.    You may shower after 24 hours, but please do not remove the dressing. Please keep the dressing on until your appointment with Dr. Bingham.    Dr. Stark should call with pathology report in approximately 2 weeks.  The conversation will determine further management.

## 2021-07-20 NOTE — BRIEF OPERATIVE NOTE - OPERATION/FINDINGS
0.6 mm melanoma of the posterior upper left thigh excised with a minimum 1 cm margin, and plastics reconstruction

## 2021-07-20 NOTE — ASU DISCHARGE PLAN (ADULT/PEDIATRIC) - CARE PROVIDER_API CALL
Tez Bingham (MD)  ColonRectal Surgery; Plastic Surgery; Surgery; Surgery of the Hand  [address], [address]  [place] [address]  Phone: [phone]  Fax: [phone]  Follow Up Time: 1 week

## 2021-07-20 NOTE — ASU DISCHARGE PLAN (ADULT/PEDIATRIC) - NURSING INSTRUCTIONS
You received IV Tylenol for pain management at 9am. Please DO NOT take any Tylenol (Acetaminophen) containing products, such as Vicodin, Percocet, Excedrin, and cold medications for the next 6 hours (until 2 PM). DO NOT TAKE MORE THAN 3000 MG OF TYLENOL in a 24 hour period.

## 2021-07-23 LAB — SURGICAL PATHOLOGY STUDY: SIGNIFICANT CHANGE UP

## 2021-07-26 ENCOUNTER — APPOINTMENT (OUTPATIENT)
Dept: PLASTIC SURGERY | Facility: CLINIC | Age: 46
End: 2021-07-26
Payer: COMMERCIAL

## 2021-07-26 PROCEDURE — 99024 POSTOP FOLLOW-UP VISIT: CPT

## 2021-07-26 NOTE — HISTORY OF PRESENT ILLNESS
[de-identified] : 45-year-old lady referred by dermatology (Carol ZARATE NP; Dr. Fernando REILLY) with a melanoma in situ of the LEFT THIGH (upper, outer, posterior).\par \par She has a "many year" history of an asymptomatic pigmented lesion in that location.\par \par "Recently" it became a little larger, developing an asymmetric shape, with irregular borders, and a rough surface.\par No other signs or symptoms.\par No preceding trauma.\par \par The above changes led to dermatologic evaluation, biopsy, and the above diagnosis.\par \par No prior personal history of melanoma.\par \par + Prior personal history:\par ~2018: Excision of a nonmelanoma skin cancer, by dermatology, from the xiphoid region.\par No other personal history of skin cancer.\par \par No other personal history of malignancy.\par \par No relatives with skin cancer.\par \par No relatives with any history of malignancy.\par \par \par Derm: [facility] dermatology.\par Carol ZARATE NP.\par Dr. Fernando REILLY.\par \par \par PMD: Dr. Elena CANELA.\par She used to see Dr. Melissa Mares.\par \par No pacemaker or defibrillator.\par No anticoagulants.\par \par + Hypertension.\par Treated with amlodipine.\par She has not had to see a cardiologist.\par \par + Heartburn.\par No.\par She takes pantoprazole for symptomatic relief.\par \par + LBP (low back pain).\par 2018: Lumbar laminectomy (Dr. Reyes).\par Now follows up with orthopedics: Dr. Casey CARREON.\par Symptoms treated with diclofenac.\par Neurology: Dr. Fletcher GARSIA\par \par \par GYN: Dr. Damion KNOWLES.\par January 2021 Pap smear was normal.\par \par \par February 2021:\par And we'll bilateral mammogram and sonogram in [place]: BIRADS-0.\par Equivocal findings: Right breast (central).\par March 2021:\par Diagnostic right breast sonogram: Mammographic nodule is a cyst: BI-RADS 2.\par \par \par Baseline colonoscopy will be at age 52

## 2021-07-26 NOTE — ASSESSMENT
[FreeTextEntry1] : Her biopsy site on the posterior upper outer left thigh is healing well\par \par We discussed the recently diagnosed melanoma in situ of the posterior left thigh along with the indications and technique for appropriate excision.\par \par Given the anatomic location of the lesion, and anticipated size of the surgical defect, we'll coordinate the operation plastic surgery.\par \par Reviewed in detail, all questions answered.\par \par She understands and would like to proceed with the surgery.\par She does have a vacation planned in [place], from July 9 to July 15, inclusive.\par We will arrange for her presurgical testing prior to departure, and surgery for after she returns.\par Paperwork submitted\par \par Note dictated to the referring physicians\par \par \par 7/26/2021:\par We spoke.\par July 20, 2021, she had wide excision of a melanoma in situ from the upper, posterior, left thigh.\par Plastics: Dr. Tez Bingham.\par Surgical pathology:\par Scar secondary to previous procedure, no residual melanoma.\par Presently, no further intervention is warranted.\par Note dictated to the referring physicians.\par My office will call to schedule a postoperative visit.

## 2021-07-26 NOTE — PHYSICAL EXAM
[Normal] : supple, no neck mass and thyroid not enlarged [Normal Neck Lymph Nodes] : normal neck lymph nodes  [Normal Supraclavicular Lymph Nodes] : normal supraclavicular lymph nodes [Normal Groin Lymph Nodes] : normal groin lymph nodes [Normal Axillary Lymph Nodes] : normal axillary lymph nodes [Normal] : full range of motion and no deformities appreciated [de-identified] : Below

## 2021-07-26 NOTE — REASON FOR VISIT
[Initial Consultation] : an initial consultation for [Other: _____] : [unfilled] [FreeTextEntry2] : Posterior, upper, left thigh melanoma in situ

## 2021-07-26 NOTE — REVIEW OF SYSTEMS
[Negative] : Heme/Lymph [FreeTextEntry5] : Hypertension [FreeTextEntry8] : Heartburn [de-identified] : Melanoma [de-identified] : Sciatica

## 2021-08-03 ENCOUNTER — APPOINTMENT (OUTPATIENT)
Dept: PLASTIC SURGERY | Facility: CLINIC | Age: 46
End: 2021-08-03
Payer: COMMERCIAL

## 2021-08-03 VITALS
HEIGHT: 64 IN | WEIGHT: 143 LBS | SYSTOLIC BLOOD PRESSURE: 159 MMHG | TEMPERATURE: 98.7 F | HEART RATE: 108 BPM | OXYGEN SATURATION: 100 % | DIASTOLIC BLOOD PRESSURE: 97 MMHG | BODY MASS INDEX: 24.41 KG/M2

## 2021-08-03 PROCEDURE — 99212 OFFICE O/P EST SF 10 MIN: CPT

## 2022-01-01 NOTE — ED PROVIDER NOTE - NS_ATTENDINGSCRIBE_ED_ALL_ED
I personally performed the service described in the documentation recorded by the scribe in my presence, and it accurately and completely records my words and actions. yes

## 2022-01-21 ENCOUNTER — APPOINTMENT (OUTPATIENT)
Dept: OBGYN | Facility: CLINIC | Age: 47
End: 2022-01-21
Payer: COMMERCIAL

## 2022-01-21 VITALS
DIASTOLIC BLOOD PRESSURE: 98 MMHG | SYSTOLIC BLOOD PRESSURE: 148 MMHG | WEIGHT: 137 LBS | HEIGHT: 64 IN | BODY MASS INDEX: 23.39 KG/M2

## 2022-01-21 PROCEDURE — 99396 PREV VISIT EST AGE 40-64: CPT

## 2022-01-21 NOTE — HISTORY OF PRESENT ILLNESS
[FreeTextEntry1] : no issues\par Recent resection of melanoma in situ by Dr. Stark\par all clear  [Patient reported mammogram was normal] : Patient reported mammogram was normal [Patient reported PAP Smear was normal] : Patient reported PAP Smear was normal [Mammogramdate] : 2021 [PapSmeardate] : 2020

## 2022-01-24 LAB — HPV HIGH+LOW RISK DNA PNL CVX: NOT DETECTED

## 2022-01-27 LAB — CYTOLOGY CVX/VAG DOC THIN PREP: NORMAL

## 2022-02-25 ENCOUNTER — TRANSCRIPTION ENCOUNTER (OUTPATIENT)
Age: 47
End: 2022-02-25

## 2022-03-14 ENCOUNTER — APPOINTMENT (OUTPATIENT)
Dept: ULTRASOUND IMAGING | Facility: CLINIC | Age: 47
End: 2022-03-14
Payer: COMMERCIAL

## 2022-03-14 ENCOUNTER — APPOINTMENT (OUTPATIENT)
Dept: MAMMOGRAPHY | Facility: CLINIC | Age: 47
End: 2022-03-14
Payer: COMMERCIAL

## 2022-03-14 ENCOUNTER — OUTPATIENT (OUTPATIENT)
Dept: OUTPATIENT SERVICES | Facility: HOSPITAL | Age: 47
LOS: 1 days | End: 2022-03-14
Payer: COMMERCIAL

## 2022-03-14 ENCOUNTER — RESULT REVIEW (OUTPATIENT)
Age: 47
End: 2022-03-14

## 2022-03-14 DIAGNOSIS — Z98.890 OTHER SPECIFIED POSTPROCEDURAL STATES: Chronic | ICD-10-CM

## 2022-03-14 DIAGNOSIS — K08.499 PARTIAL LOSS OF TEETH DUE TO OTHER SPECIFIED CAUSE, UNSPECIFIED CLASS: Chronic | ICD-10-CM

## 2022-03-14 DIAGNOSIS — Z00.8 ENCOUNTER FOR OTHER GENERAL EXAMINATION: ICD-10-CM

## 2022-03-14 PROCEDURE — 77063 BREAST TOMOSYNTHESIS BI: CPT | Mod: 26

## 2022-03-14 PROCEDURE — 76641 ULTRASOUND BREAST COMPLETE: CPT | Mod: 26,50

## 2022-03-14 PROCEDURE — 77067 SCR MAMMO BI INCL CAD: CPT

## 2022-03-14 PROCEDURE — 77067 SCR MAMMO BI INCL CAD: CPT | Mod: 26

## 2022-03-14 PROCEDURE — 77063 BREAST TOMOSYNTHESIS BI: CPT

## 2022-03-14 PROCEDURE — 76641 ULTRASOUND BREAST COMPLETE: CPT

## 2022-04-15 NOTE — ED ADULT NURSE NOTE - NS ED NURSE DISCH DISPOSITION
April 15, 2022       Olga Lidia Mac MD  [address] [facility] [address]  Via In Basket      Patient: Paulina Velazquez   YOB: 1943   Date of Visit: 4/15/2022       Dear Dr. Mac:    Thank you for referring Paulina Velazquez to me for evaluation. Below are my notes for this visit with her.    If you have questions, please do not hesitate to call me. I look forward to following your patient along with you.      Sincerely,        Fabricio Baen MD        CC: No Recipients  Fabricio Bean MD  4/15/2022 12:20 PM  Signed  Subjective  Patient ID: Paulina Velazquez is a female.    Chief Complaint   Patient presents with   • New Patient     LBBB no issue at this time        HPI  Today is an initial comprehensive CV visit (cardiology consultation) for this 78-year-old female who is seen today at the request of Dr. Olga Lidia Mac to assess the patient's CV status.    Review of the patient's family history at today's visit finds a positive family history for hypertension relating to the patient (patient's mother).    The patient was found on a recent twelve-lead electrocardiogram to have a left bundle branch block.  ECG taken on November 8, 2019 for the patient did not show a left bundle branch block.    ECG obtained today for the patient shows sinus bradycardia with a left bundle branch block.  Left axis deviation is present.  Nonspecific repolarization changes are present.  The results of today's abnormal ECG were discussed with the patient as well as the patient's  (present at today's visit).    The patient reports that she has been feeling well.  The patient specifically denies current chest pain, shortness of breath, palpitations, syncope or near syncope.    The patient's blood pressure at today's visit is well controlled at 119/63 mmHg with regard to the patient's essential hypertension issue.    The patient reports no recent abnormal bleeding with regard to low-dose aspirin therapy in  progress.    I suggested further cardiac testing for the patient on the basis of the left bundle branch block and sinus bradycardia issues noted for the patient at today's visit.    Cardiac testing recommended for the patient at this time included: A Lexiscan Myoview examination as well as an  echooppler examination as well as a 3-day extended-wear Holter monitor examination.    Both the patient and the patient's  (present at today's visit) would like to proceed only with an echodoppler examination at this time.  It is anticipated that this echodoppler examination will be obtained on an outpatient basis in the next several weeks    I plan to resee the patient in 1 month to assess the patient's CV status as well as CV issues and to review with the patient the results of the above pending  echooppler examination.    Today's initial comprehensive CV visit (cardiology consultation) involved the dedicated evaluation and management related to issues of: left bundle branch block abnormal ECG, sinus bradycardia, essential hypertension, low-dose aspirin therapy, positive family history for hypertension relating to the patient (patient's mother).      Social History  Social History     Tobacco Use   • Smoking status: Never Smoker   • Smokeless tobacco: Never Used   Substance Use Topics   • Alcohol use: No   • Drug use: No        Allergies  ALLERGIES:   Allergen Reactions   • Amoxicillin PRURITUS       Presenting Medications  Current Outpatient Medications   Medication Sig Dispense Refill   • carboxymethylcellulose-glycerin 0.5-0.9 % ophthalmic solution Place 1 drop into both eyes daily.     • lisinopril (ZESTRIL) 20 MG tablet Take 1 tablet by mouth daily. 90 tablet 3   • amLODIPine (NORVASC) 5 MG tablet Take 1 tablet by mouth daily. 90 tablet 3   • latanoprost (XALATAN) 0.005 % ophthalmic solution INT 1 GTT IN OU D  4   • Omega 3 1000 MG capsule Take 1,000 mg by mouth daily.     • aspirin 81 MG tablet Take 81 mg by  mouth daily.       No current facility-administered medications for this visit.       Review of Systems  Review of Systems   Constitutional: Negative.   HENT: Negative.    Eyes: Negative.    Cardiovascular: Negative.    Respiratory: Negative.    Endocrine: Negative.    Hematologic/Lymphatic: Negative.    Skin: Negative.    Musculoskeletal: Negative.    Gastrointestinal: Negative.    Genitourinary: Negative.    Neurological: Negative.    Psychiatric/Behavioral: Negative.    Allergic/Immunologic: Negative.        Physical Exam  Visit Vitals  /63 (BP Location: LUE - Left upper extremity, Patient Position: Sitting, Cuff Size: Small Adult)   Pulse (!) 55   Temp 98.2 °F (36.8 °C) (Axillary)   Resp 20   Ht 4' 8.3\" (1.43 m)   Wt 53 kg (116 lb 13.5 oz)   SpO2 99%   BMI 25.92 kg/m²     Vital signs were reviewed today.    Physical Exam   Constitutional: She appears healthy. No distress.   Eyes: Pupils are equal, round, and reactive to light. Conjunctivae are normal.   Neck: No JVD present. No neck adenopathy.   Cardiovascular: Regular rhythm, S1 normal, S2 normal and intact distal pulses.  No extrasystoles are present. Bradycardia present. PMI is not displaced. Exam reveals no gallop, no S3, no S4, no distant heart sounds, no friction rub, no midsystolic click and no opening snap.   No murmur heard.  Pulses:       Carotid pulses are 1+ on the right side and 1+ on the left side.       Radial pulses are 1+ on the right side and 1+ on the left side.        Femoral pulses are 1+ on the right side and 1+ on the left side.       Popliteal pulses are 1+ on the right side and 1+ on the left side.        Dorsalis pedis pulses are 1+ on the right side and 1+ on the left side.        Posterior tibial pulses are 1+ on the right side and 1+ on the left side.   Pulmonary/Chest: Effort normal and breath sounds normal. No stridor. She has no wheezes. She has no rales. She exhibits no tenderness.   Abdominal: Soft. Bowel sounds are  normal. She exhibits no distension and no mass. There is no splenomegaly or hepatomegaly. There is no abdominal tenderness. No hernia.   Musculoskeletal:         General: No tenderness, deformity or edema. Normal range of motion.      Cervical back: Normal range of motion and neck supple.   Neurological: She is alert and oriented to person, place, and time. She has normal motor skills and intact cranial nerves. Gait normal.   Skin: Skin is warm and dry. No rash noted. No cyanosis. No jaundice, pallor or plethora. Nails show no clubbing.       Recent Labs    Recent Labs   Lab 03/18/22  0806   Cholesterol 188   HDL 74   Triglycerides 70   CALCLDL 100   Non-HDL Cholesterol 114       Recent Labs   Lab 03/18/22  0806   Sodium 142   Chloride 106   BUN 16   BUN/ Creatinine Ratio 25   Potassium 4.3   Glucose 89   Creatinine 0.65   Calcium 9.1   TSH 0.830          Hemoglobin A1C (%)   Date Value   03/18/2022 5.6   .    Recent Labs   Lab 04/06/22  1101   WBC 6.4   RBC 4.72   HGB 14.6   HCT 44.6   MCV 94.5   MCHC 32.7   RDW-CV 13.4      Lymphocytes, Percent 34       Recent Labs   Lab 03/18/22  0806   GPT 17         Assessment / Plan  Patient Active Problem List   Diagnosis   • Hyperlipidemia   • OAB (overactive bladder)   • Vaginal wall prolapse   • Hypertension   • Acid reflux   • Age-related osteoporosis without current pathological fracture   • Abnormal electrocardiogram (ECG) (EKG)   • LBBB (left bundle branch block)   • Sinus bradycardia         Fabricio Bean MD, [facility], [facility] Cardiologist                Admitted

## 2022-12-20 ENCOUNTER — NON-APPOINTMENT (OUTPATIENT)
Age: 47
End: 2022-12-20

## 2023-01-05 ENCOUNTER — NON-APPOINTMENT (OUTPATIENT)
Age: 48
End: 2023-01-05

## 2023-02-06 RX ORDER — DICLOFENAC SODIUM 75 MG/1
75 TABLET, DELAYED RELEASE ORAL TWICE DAILY
Qty: 60 | Refills: 1 | Status: ACTIVE | COMMUNITY
Start: 2021-02-16 | End: 1900-01-01

## 2023-02-13 ENCOUNTER — APPOINTMENT (OUTPATIENT)
Dept: OBGYN | Facility: CLINIC | Age: 48
End: 2023-02-13
Payer: COMMERCIAL

## 2023-02-13 VITALS
BODY MASS INDEX: 23.73 KG/M2 | WEIGHT: 139 LBS | SYSTOLIC BLOOD PRESSURE: 140 MMHG | HEIGHT: 64 IN | DIASTOLIC BLOOD PRESSURE: 92 MMHG

## 2023-02-13 DIAGNOSIS — Z87.42 PERSONAL HISTORY OF OTHER DISEASES OF THE FEMALE GENITAL TRACT: ICD-10-CM

## 2023-02-13 DIAGNOSIS — I10 ESSENTIAL (PRIMARY) HYPERTENSION: ICD-10-CM

## 2023-02-13 DIAGNOSIS — Z85.820 PERSONAL HISTORY OF MALIGNANT MELANOMA OF SKIN: ICD-10-CM

## 2023-02-13 DIAGNOSIS — N76.2 ACUTE VULVITIS: ICD-10-CM

## 2023-02-13 DIAGNOSIS — R10.2 PELVIC AND PERINEAL PAIN: ICD-10-CM

## 2023-02-13 PROCEDURE — 99396 PREV VISIT EST AGE 40-64: CPT

## 2023-02-13 RX ORDER — CEPHALEXIN 500 MG/1
500 CAPSULE ORAL EVERY 6 HOURS
Qty: 40 | Refills: 0 | Status: COMPLETED | COMMUNITY
Start: 2021-04-13 | End: 2023-02-13

## 2023-02-13 RX ORDER — AMLODIPINE BESYLATE 5 MG/1
5 TABLET ORAL
Refills: 0 | Status: ACTIVE | COMMUNITY
Start: 2023-02-13

## 2023-02-13 RX ORDER — MEDROXYPROGESTERONE ACETATE 10 MG/1
10 TABLET ORAL DAILY
Qty: 5 | Refills: 0 | Status: COMPLETED | COMMUNITY
Start: 2022-12-21 | End: 2023-02-13

## 2023-02-13 RX ORDER — TERCONAZOLE 4 MG/G
0.4 CREAM VAGINAL
Qty: 1 | Refills: 0 | Status: COMPLETED | COMMUNITY
Start: 2021-04-16 | End: 2023-02-13

## 2023-02-13 RX ORDER — MEDROXYPROGESTERONE ACETATE 10 MG/1
10 TABLET ORAL
Qty: 5 | Refills: 0 | Status: COMPLETED | COMMUNITY
Start: 2022-12-20 | End: 2023-02-13

## 2023-02-13 NOTE — HISTORY OF PRESENT ILLNESS
[Patient reported mammogram was normal] : Patient reported mammogram was normal [Patient reported breast sonogram was normal] : Patient reported breast sonogram was normal [Patient reported PAP Smear was normal] : Patient reported PAP Smear was normal [FreeTextEntry1] : 48YO P2 LMP 1/10 presents for checkup, menses less regular, no other complaints except more frequent H/As premenses. [Mammogramdate] : 3/22 [BreastSonogramDate] : 3/22 [PapSmeardate] : 1/22

## 2023-02-13 NOTE — PHYSICAL EXAM
[Chaperone Present] : A chaperone was present in the examining room during all aspects of the physical examination [Appropriately responsive] : appropriately responsive [Alert] : alert [No Acute Distress] : no acute distress [No Lymphadenopathy] : no lymphadenopathy [Regular Rate Rhythm] : regular rate rhythm [No Murmurs] : no murmurs [Clear to Auscultation B/L] : clear to auscultation bilaterally [Soft] : soft [Non-tender] : non-tender [Non-distended] : non-distended [No HSM] : No HSM [No Lesions] : no lesions [No Mass] : no mass [Oriented x3] : oriented x3 [Examination Of The Breasts] : a normal appearance [No Masses] : no breast masses were palpable [Labia Majora] : normal [Labia Minora] : normal [No Bleeding] : There was no active vaginal bleeding [Normal] : normal [Tenderness] : nontender [Anteversion] : anteverted [Uterine Adnexae] : normal

## 2023-02-14 ENCOUNTER — NON-APPOINTMENT (OUTPATIENT)
Age: 48
End: 2023-02-14

## 2023-02-14 LAB
HPV HIGH+LOW RISK DNA PNL CVX: NOT DETECTED
PROLACTIN SERPL-MCNC: 5.4 NG/ML
TSH SERPL-ACNC: 1.87 UIU/ML

## 2023-02-16 LAB — CYTOLOGY CVX/VAG DOC THIN PREP: NORMAL

## 2023-03-30 ENCOUNTER — TRANSCRIPTION ENCOUNTER (OUTPATIENT)
Age: 48
End: 2023-03-30

## 2023-03-30 ENCOUNTER — RX RENEWAL (OUTPATIENT)
Age: 48
End: 2023-03-30

## 2023-03-31 ENCOUNTER — APPOINTMENT (OUTPATIENT)
Dept: MAMMOGRAPHY | Facility: CLINIC | Age: 48
End: 2023-03-31
Payer: COMMERCIAL

## 2023-03-31 ENCOUNTER — APPOINTMENT (OUTPATIENT)
Dept: ULTRASOUND IMAGING | Facility: CLINIC | Age: 48
End: 2023-03-31
Payer: COMMERCIAL

## 2023-03-31 ENCOUNTER — RESULT REVIEW (OUTPATIENT)
Age: 48
End: 2023-03-31

## 2023-03-31 ENCOUNTER — OUTPATIENT (OUTPATIENT)
Dept: OUTPATIENT SERVICES | Facility: HOSPITAL | Age: 48
LOS: 1 days | End: 2023-03-31
Payer: COMMERCIAL

## 2023-03-31 DIAGNOSIS — Z00.8 ENCOUNTER FOR OTHER GENERAL EXAMINATION: ICD-10-CM

## 2023-03-31 DIAGNOSIS — Z98.890 OTHER SPECIFIED POSTPROCEDURAL STATES: Chronic | ICD-10-CM

## 2023-03-31 DIAGNOSIS — Z00.00 ENCOUNTER FOR GENERAL ADULT MEDICAL EXAMINATION WITHOUT ABNORMAL FINDINGS: ICD-10-CM

## 2023-03-31 DIAGNOSIS — K08.499 PARTIAL LOSS OF TEETH DUE TO OTHER SPECIFIED CAUSE, UNSPECIFIED CLASS: Chronic | ICD-10-CM

## 2023-03-31 PROCEDURE — 76641 ULTRASOUND BREAST COMPLETE: CPT | Mod: 26,50

## 2023-03-31 PROCEDURE — 77063 BREAST TOMOSYNTHESIS BI: CPT | Mod: 26

## 2023-03-31 PROCEDURE — 77067 SCR MAMMO BI INCL CAD: CPT | Mod: 26

## 2023-03-31 PROCEDURE — 77063 BREAST TOMOSYNTHESIS BI: CPT

## 2023-03-31 PROCEDURE — 77067 SCR MAMMO BI INCL CAD: CPT

## 2023-03-31 PROCEDURE — 76641 ULTRASOUND BREAST COMPLETE: CPT

## 2023-04-11 ENCOUNTER — TRANSCRIPTION ENCOUNTER (OUTPATIENT)
Age: 48
End: 2023-04-11

## 2023-07-18 NOTE — ASU PATIENT PROFILE, ADULT - IS PATIENT PREGNANT?
Clinic Note    Reason for visit:  The primary encounter diagnosis was Rectal discharge. Diagnoses of Hemorrhoids, unspecified hemorrhoid type, History of colon cancer, and History of colon polyps were also pertinent to this visit.    PCP: Robert Mcgowan       HPI:  This is a 78 y.o. female who is being seen for a follow-up. Hard of hearing. History of colon cancer stage I in 2005, s/p right hemicolectomy. Patient denies any rectal bleeding but reports having mucus discharge after BMs at times and wears a pad for this. Was told due to hemorrhoids. No issues with BMs. Has BMs daily, sometimes 2-3 times per day. She has been taking Metamucil for years and wonders if body gets immune. She takes up to 8 Metamucil pills a day.    CT abd w/ and w/o IV 2/2023: No GB, calcified granulomas of spleen, small 1.3 cm lesion in the left kidney measures 34 Hounsfield units precontrast in 50 Hounsfield units postcontrast which suggest mild enhancement.  This is suspicious for renal neoplasm however due to small size is also possible change in density secondary to volume averaging.  Following up with urology. Repeat CT is scheduled for 9/2023.    Had colonoscopy 5/9/2022 with Dr. Merritt, due to having rectal bleeding at that time, who reported multiple large diverticula in descending/sigmoid colon and multiple medium-sized IH.    Review of Systems   Constitutional:  Negative for fatigue, fever and unexpected weight change.   HENT:  Negative for mouth sores, postnasal drip, sore throat and trouble swallowing.    Eyes:  Negative for pain, discharge and eye dryness.   Respiratory:  Negative for apnea, cough, choking, chest tightness, shortness of breath and wheezing.    Cardiovascular:  Negative for chest pain, palpitations and leg swelling.   Gastrointestinal:  Negative for abdominal distention, abdominal pain, anal bleeding, blood in stool, change in bowel habit, constipation, diarrhea, nausea, rectal pain, vomiting, reflux, fecal  incontinence and change in bowel habit.   Genitourinary:  Negative for bladder incontinence, dysuria and hematuria.   Musculoskeletal:  Negative for arthralgias, back pain and joint swelling.   Integumentary:  Negative for color change and rash.   Allergic/Immunologic: Negative for environmental allergies and food allergies.   Neurological:  Negative for seizures and headaches.   Hematological:  Negative for adenopathy. Does not bruise/bleed easily.      Past Medical History:   Diagnosis Date    Chronic back pain     Colon cancer     s/p resection    High cholesterol     History of aortic stenosis     Hypertension     Left bundle branch block     Obesity, Class II, BMI 35-39.9, isolated (see actual BMI)     Personal history of colonic polyps      Past Surgical History:   Procedure Laterality Date    AORTIC VALVULOPLASTY      bovine    APPENDECTOMY      CHOLECYSTECTOMY      CHOLECYSTOTOMY      RIGHT HEMICOLECTOMY      TOTAL ABDOMINAL HYSTERECTOMY      TOTAL REPLACEMENT OF BOTH KNEES USING COMPUTER-ASSISTED NAVIGATION       Family History   Problem Relation Age of Onset    Lung cancer Father     Cervical cancer Sister      Social History     Tobacco Use    Smoking status: Former     Types: Cigarettes     Quit date:      Years since quittin.5    Smokeless tobacco: Never   Substance Use Topics    Alcohol use: Not Currently    Drug use: Not Currently     Review of patient's allergies indicates:   Allergen Reactions    Vicodin [hydrocodone-acetaminophen] Hives        Medication List with Changes/Refills   New Medications    SOD SULF-POT CHLORIDE-MAG SULF (SUTAB) 1.479-0.188- 0.225 GRAM TABLET    Take 12 tablets by mouth once daily. Take according to package instructions with indicated amount of water. No breakfast day before test. May substitute with Suprep, Clenpiq, Plenvu, Moviprep or GoLytely based on Rx plan and patient preference.   Current Medications    AMLODIPINE (NORVASC) 5 MG TABLET    Take 5 mg  "by mouth.    ASPIRIN (ECOTRIN) 81 MG EC TABLET    Take 81 mg by mouth once daily.    CLOBETASOL (TEMOVATE) 0.05 % CREAM    APPLY ON THE SKIN TWICE A DAY    DIAZEPAM (VALIUM) 5 MG TABLET    TAKE 1 TABLET BY MOUTH ONCE DAILY AT NIGHT AS NEEDED    ESTRADIOL (ESTRACE) 0.01 % (0.1 MG/GRAM) VAGINAL CREAM    Place 1 g vaginally twice a week.    GABAPENTIN (NEURONTIN) 100 MG CAPSULE    Take 100 mg by mouth 3 (three) times daily.    LORATADINE (CLARITIN) 10 MG TABLET    Take 10 mg by mouth.    METOPROLOL TARTRATE (LOPRESSOR) 25 MG TABLET    TAKE 1/2 (ONE-HALF) TABLET BY MOUTH TWICE DAILY    METOPROLOL TARTRATE (LOPRESSOR) 50 MG TABLET        NITROGLYCERIN (NITROSTAT) 0.4 MG SL TABLET        SIMVASTATIN (ZOCOR) 40 MG TABLET    Take 40 mg by mouth every evening.   Discontinued Medications    NYSTATIN (MYCOSTATIN) POWDER    Apply to affected area 3 times daily    NYSTATIN-TRIAMCINOLONE (MYCOLOG) OINTMENT    Apply topically 2 (two) times daily.         Vital Signs:  /77   Pulse (!) 59   Ht 5' 2" (1.575 m)   Wt 88.5 kg (195 lb)   SpO2 (!) 93%   BMI 35.67 kg/m²         Physical Exam  Vitals reviewed.   Constitutional:       General: She is awake. She is not in acute distress.     Appearance: Normal appearance. She is well-developed. She is obese. She is not ill-appearing, toxic-appearing or diaphoretic.      Comments: Hard of hearing   HENT:      Head: Normocephalic and atraumatic.      Nose: Nose normal.      Mouth/Throat:      Mouth: Mucous membranes are moist.      Pharynx: Oropharynx is clear. No oropharyngeal exudate or posterior oropharyngeal erythema.   Eyes:      General: Lids are normal. Gaze aligned appropriately. No scleral icterus.        Right eye: No discharge.         Left eye: No discharge.      Extraocular Movements: Extraocular movements intact.      Conjunctiva/sclera: Conjunctivae normal.   Neck:      Trachea: Trachea normal.   Cardiovascular:      Rate and Rhythm: Normal rate and regular rhythm.     "  Pulses:           Radial pulses are 2+ on the right side and 2+ on the left side.   Pulmonary:      Effort: Pulmonary effort is normal. No respiratory distress.      Breath sounds: Normal breath sounds. No stridor. No wheezing or rhonchi.   Chest:      Chest wall: No tenderness.   Abdominal:      General: Bowel sounds are normal. There is no distension.      Palpations: Abdomen is soft. There is no fluid wave, hepatomegaly or mass.      Tenderness: There is no abdominal tenderness. There is no guarding or rebound.   Musculoskeletal:         General: No tenderness or deformity.      Cervical back: Full passive range of motion without pain and neck supple. No tenderness.      Right lower leg: No edema.      Left lower leg: No edema.      Comments: +kyphosis    Lymphadenopathy:      Cervical: No cervical adenopathy.   Skin:     General: Skin is warm and dry.      Capillary Refill: Capillary refill takes less than 2 seconds.      Coloration: Skin is not cyanotic, jaundiced or pale.      Findings: No rash.   Neurological:      General: No focal deficit present.      Mental Status: She is alert and oriented to person, place, and time.      Cranial Nerves: No facial asymmetry.      Motor: No tremor.   Psychiatric:         Attention and Perception: Attention normal.         Mood and Affect: Mood and affect normal.         Speech: Speech normal.         Behavior: Behavior normal. Behavior is cooperative.          All of the data above and below has been reviewed by myself and any further interpretations will be reflected in the assessment and plan.   The data includes review of external notes, and independent interpretation of lab results, procedures, x-rays, and imaging reports.      Assessment:  Rectal discharge    Hemorrhoids, unspecified hemorrhoid type    History of colon cancer  -     Ambulatory Referral to External Surgery  -     sod sulf-pot chloride-mag sulf (SUTAB) 1.479-0.188- 0.225 gram tablet; Take 12 tablets  by mouth once daily. Take according to package instructions with indicated amount of water. No breakfast day before test. May substitute with Suprep, Clenpiq, Plenvu, Moviprep or GoLytely based on Rx plan and patient preference.  Dispense: 24 tablet; Refill: 0    History of colon polyps  -     Ambulatory Referral to External Surgery  -     sod sulf-pot chloride-mag sulf (SUTAB) 1.479-0.188- 0.225 gram tablet; Take 12 tablets by mouth once daily. Take according to package instructions with indicated amount of water. No breakfast day before test. May substitute with Suprep, Clenpiq, Plenvu, Moviprep or GoLytely based on Rx plan and patient preference.  Dispense: 24 tablet; Refill: 0    Due for surveillance colonoscopy.  Mucus discharge after BMs at times. Will change Metamucil to fiber gummies. Would like benefit from pelvic floor therapy.     Recommendations:  Schedule colonoscopy.   May try taking fiber gummies, start with 2 gummies daily and may increase to 4 daily after a few weeks if doing well.    Risks, benefits, and alternatives of medical management, any associated procedures, and/or treatment discussed with the patient. Patient given opportunity to ask questions and voices understanding. Patient has elected to proceed with the recommended care modalities as discussed.    Instructed patient to notify my office if they have not been contacted within two weeks after any procedures, submitting any samples (biopsies, blood, stool, urine, etc.) or after any imaging (X-ray, CT, MRI, etc.).     Follow up in about 1 year (around 7/18/2024).    Order summary:  Orders Placed This Encounter   Procedures    Ambulatory Referral to External Surgery          This document may have been created using a voice recognition transcribing system. Incorrect words or phrases may have been missed during proofreading. Please interpret accordingly or contact me for clarification.     Ebonie Ellsworth MD     no

## 2023-08-26 NOTE — H&P PST ADULT - LAST CARDIAC ANGIOGRAM/IMAGING
Met with the patient to complete assessment and explain role of CM  He lives alone in a first floor apartment with 2 BURAK  The patient is independent, drives and employed as a road   He recently has been using crutches but has no other DME and no C  Patient denies MH and D&A treatment  PCP is Dr Giovany Guzman [place], [place]  The patient has prescription coverage and uses [facility], [facility]  , [facility]  CM reviewed d/c planning process including the following: identifying help at home, patient preference for d/c planning needs, Discharge Lounge, [facility] Meds to Bed program, availability of treatment team to discuss questions or concerns patient and/or family may have regarding understanding medications and recognizing signs and symptoms once discharged  CM also encouraged patient to follow up with all recommended appointments after discharge  Patient advised of importance for patient and family to participate in managing patients medical well being  Patient/caregiver received discharge checklist  Content reviewed  Patient/caregiver encouraged to participate in discharge plan of care prior to discharge home  denies negative...

## 2023-09-10 NOTE — H&P PST ADULT - NSSUBSTANCEUSE_GEN_ALL_CORE_SD
caffeine You can access the FollowMyHealth Patient Portal offered by [facility] by registering at the following website: [email or web address]. By joining [employer]’s FollowMyHealth portal, you will also be able to view your health information using other applications (apps) compatible with our system.

## 2023-09-14 NOTE — H&P PST ADULT - GENERAL
negative Medical Necessity Statement: Based on my medical judgement, Mohs surgery is the most appropriate treatment for this cancer compared to other treatments.

## 2024-04-08 ENCOUNTER — APPOINTMENT (OUTPATIENT)
Dept: OBGYN | Facility: CLINIC | Age: 49
End: 2024-04-08
Payer: COMMERCIAL

## 2024-04-15 ENCOUNTER — APPOINTMENT (OUTPATIENT)
Dept: OBGYN | Facility: CLINIC | Age: 49
End: 2024-04-15
Payer: COMMERCIAL

## 2024-04-15 VITALS — DIASTOLIC BLOOD PRESSURE: 94 MMHG | SYSTOLIC BLOOD PRESSURE: 145 MMHG | WEIGHT: 144 LBS | BODY MASS INDEX: 24.72 KG/M2

## 2024-04-15 DIAGNOSIS — Z87.42 PERSONAL HISTORY OF OTHER DISEASES OF THE FEMALE GENITAL TRACT: ICD-10-CM

## 2024-04-15 DIAGNOSIS — R14.0 ABDOMINAL DISTENSION (GASEOUS): ICD-10-CM

## 2024-04-15 DIAGNOSIS — Z01.419 ENCOUNTER FOR GYNECOLOGICAL EXAMINATION (GENERAL) (ROUTINE) W/OUT ABNORMAL FINDINGS: ICD-10-CM

## 2024-04-15 PROCEDURE — 99396 PREV VISIT EST AGE 40-64: CPT

## 2024-04-15 RX ORDER — MEDROXYPROGESTERONE ACETATE 10 MG/1
10 TABLET ORAL DAILY
Qty: 10 | Refills: 0 | Status: COMPLETED | COMMUNITY
Start: 2023-03-31 | End: 2024-04-15

## 2024-04-15 RX ORDER — PANTOPRAZOLE 20 MG/1
20 TABLET, DELAYED RELEASE ORAL
Qty: 90 | Refills: 0 | Status: ACTIVE | COMMUNITY
Start: 2023-11-09

## 2024-04-15 RX ORDER — AMOXICILLIN 500 MG/1
500 CAPSULE ORAL
Qty: 21 | Refills: 0 | Status: COMPLETED | COMMUNITY
Start: 2023-12-07

## 2024-04-15 RX ORDER — MEDROXYPROGESTERONE ACETATE 10 MG/1
10 TABLET ORAL
Qty: 10 | Refills: 3 | Status: COMPLETED | COMMUNITY
Start: 2023-04-10 | End: 2024-04-15

## 2024-04-15 NOTE — PHYSICAL EXAM
[Chaperone Present] : A chaperone was present in the examining room during all aspects of the physical examination [[id number]] : A chaperone was present during the pelvic exam. [Appropriately responsive] : appropriately responsive [Alert] : alert [No Acute Distress] : no acute distress [No Lymphadenopathy] : no lymphadenopathy [Regular Rate Rhythm] : regular rate rhythm [No Murmurs] : no murmurs [Clear to Auscultation B/L] : clear to auscultation bilaterally [Soft] : soft [Non-tender] : non-tender [Non-distended] : non-distended [No HSM] : No HSM [No Lesions] : no lesions [No Mass] : no mass [Oriented x3] : oriented x3 [Examination Of The Breasts] : a normal appearance [No Masses] : no breast masses were palpable [Labia Majora] : normal [Labia Minora] : normal [Pink Rugae] : pink rugae [No Bleeding] : There was no active vaginal bleeding [Normal] : normal [Normal Position] : in a normal position [Tenderness] : nontender [Uterine Adnexae] : normal

## 2024-04-15 NOTE — HISTORY OF PRESENT ILLNESS
[Patient reported mammogram was normal] : Patient reported mammogram was normal [Patient reported breast sonogram was normal] : Patient reported breast sonogram was normal [Patient reported PAP Smear was normal] : Patient reported PAP Smear was normal [FreeTextEntry1] : 47YO P2 LMP 4/8 on Sprintec for BC and cycle regulation, checkup without complaints. [Mammogramdate] : 3/23 [BreastSonogramDate] : 3/23 [PapSmeardate] : 2023

## 2024-04-18 LAB
CYTOLOGY CVX/VAG DOC THIN PREP: NORMAL
HPV HIGH+LOW RISK DNA PNL CVX: NOT DETECTED

## 2024-06-20 ENCOUNTER — RESULT REVIEW (OUTPATIENT)
Age: 49
End: 2024-06-20

## 2024-06-20 ENCOUNTER — APPOINTMENT (OUTPATIENT)
Dept: ULTRASOUND IMAGING | Facility: CLINIC | Age: 49
End: 2024-06-20
Payer: COMMERCIAL

## 2024-06-20 ENCOUNTER — OUTPATIENT (OUTPATIENT)
Dept: OUTPATIENT SERVICES | Facility: HOSPITAL | Age: 49
LOS: 1 days | End: 2024-06-20
Payer: COMMERCIAL

## 2024-06-20 ENCOUNTER — APPOINTMENT (OUTPATIENT)
Dept: MAMMOGRAPHY | Facility: CLINIC | Age: 49
End: 2024-06-20
Payer: COMMERCIAL

## 2024-06-20 DIAGNOSIS — Z98.890 OTHER SPECIFIED POSTPROCEDURAL STATES: Chronic | ICD-10-CM

## 2024-06-20 DIAGNOSIS — Z00.00 ENCOUNTER FOR GENERAL ADULT MEDICAL EXAMINATION WITHOUT ABNORMAL FINDINGS: ICD-10-CM

## 2024-06-20 DIAGNOSIS — K08.499 PARTIAL LOSS OF TEETH DUE TO OTHER SPECIFIED CAUSE, UNSPECIFIED CLASS: Chronic | ICD-10-CM

## 2024-06-20 PROCEDURE — 77067 SCR MAMMO BI INCL CAD: CPT

## 2024-06-20 PROCEDURE — 77063 BREAST TOMOSYNTHESIS BI: CPT

## 2024-06-20 PROCEDURE — 77063 BREAST TOMOSYNTHESIS BI: CPT | Mod: 26

## 2024-06-20 PROCEDURE — 77067 SCR MAMMO BI INCL CAD: CPT | Mod: 26

## 2024-06-20 PROCEDURE — 76641 ULTRASOUND BREAST COMPLETE: CPT | Mod: 26,50

## 2024-06-20 PROCEDURE — 76641 ULTRASOUND BREAST COMPLETE: CPT

## 2024-06-27 NOTE — H&P PST ADULT - PSH
chest wall non-tender, breathing is unlabored without accessory muscle use, normal breath sounds
H/O wisdom tooth extraction

## 2024-08-11 ENCOUNTER — EMERGENCY (EMERGENCY)
Facility: HOSPITAL | Age: 49
LOS: 1 days | Discharge: HOME | End: 2024-08-11
Attending: EMERGENCY MEDICINE
Payer: COMMERCIAL

## 2024-08-11 VITALS
SYSTOLIC BLOOD PRESSURE: 158 MMHG | HEIGHT: 63 IN | OXYGEN SATURATION: 100 % | HEART RATE: 86 BPM | TEMPERATURE: 98 F | RESPIRATION RATE: 18 BRPM | DIASTOLIC BLOOD PRESSURE: 97 MMHG | WEIGHT: 139.99 LBS

## 2024-08-11 VITALS
HEART RATE: 75 BPM | TEMPERATURE: 98 F | RESPIRATION RATE: 18 BRPM | DIASTOLIC BLOOD PRESSURE: 90 MMHG | OXYGEN SATURATION: 99 % | SYSTOLIC BLOOD PRESSURE: 129 MMHG

## 2024-08-11 DIAGNOSIS — Z98.890 OTHER SPECIFIED POSTPROCEDURAL STATES: Chronic | ICD-10-CM

## 2024-08-11 DIAGNOSIS — K08.499 PARTIAL LOSS OF TEETH DUE TO OTHER SPECIFIED CAUSE, UNSPECIFIED CLASS: Chronic | ICD-10-CM

## 2024-08-11 LAB
ALBUMIN SERPL ELPH-MCNC: 4.3 G/DL — SIGNIFICANT CHANGE UP (ref 3.3–5)
ALP SERPL-CCNC: 78 U/L — SIGNIFICANT CHANGE UP (ref 40–120)
ALT FLD-CCNC: 9 U/L — LOW (ref 10–45)
ANION GAP SERPL CALC-SCNC: 12 MMOL/L — SIGNIFICANT CHANGE UP (ref 5–17)
AST SERPL-CCNC: 12 U/L — SIGNIFICANT CHANGE UP (ref 10–40)
BILIRUB SERPL-MCNC: 0.2 MG/DL — SIGNIFICANT CHANGE UP (ref 0.2–1.2)
BUN SERPL-MCNC: 10 MG/DL — SIGNIFICANT CHANGE UP (ref 7–23)
CALCIUM SERPL-MCNC: 9.5 MG/DL — SIGNIFICANT CHANGE UP (ref 8.4–10.5)
CHLORIDE SERPL-SCNC: 105 MMOL/L — SIGNIFICANT CHANGE UP (ref 96–108)
CO2 SERPL-SCNC: 21 MMOL/L — LOW (ref 22–31)
CREAT SERPL-MCNC: 0.7 MG/DL — SIGNIFICANT CHANGE UP (ref 0.5–1.3)
EGFR: 107 ML/MIN/1.73M2 — SIGNIFICANT CHANGE UP
GLUCOSE SERPL-MCNC: 132 MG/DL — HIGH (ref 70–99)
HCG SERPL-ACNC: <2 MIU/ML — SIGNIFICANT CHANGE UP
HCT VFR BLD CALC: 42.2 % — SIGNIFICANT CHANGE UP (ref 34.5–45)
HGB BLD-MCNC: 13.9 G/DL — SIGNIFICANT CHANGE UP (ref 11.5–15.5)
MCHC RBC-ENTMCNC: 29.4 PG — SIGNIFICANT CHANGE UP (ref 27–34)
MCHC RBC-ENTMCNC: 32.9 GM/DL — SIGNIFICANT CHANGE UP (ref 32–36)
MCV RBC AUTO: 89.4 FL — SIGNIFICANT CHANGE UP (ref 80–100)
NRBC # BLD: 0 /100 WBCS — SIGNIFICANT CHANGE UP (ref 0–0)
PLATELET # BLD AUTO: 323 K/UL — SIGNIFICANT CHANGE UP (ref 150–400)
POTASSIUM SERPL-MCNC: 3.8 MMOL/L — SIGNIFICANT CHANGE UP (ref 3.5–5.3)
POTASSIUM SERPL-SCNC: 3.8 MMOL/L — SIGNIFICANT CHANGE UP (ref 3.5–5.3)
PROT SERPL-MCNC: 7.4 G/DL — SIGNIFICANT CHANGE UP (ref 6–8.3)
RBC # BLD: 4.72 M/UL — SIGNIFICANT CHANGE UP (ref 3.8–5.2)
RBC # FLD: 12.4 % — SIGNIFICANT CHANGE UP (ref 10.3–14.5)
SODIUM SERPL-SCNC: 138 MMOL/L — SIGNIFICANT CHANGE UP (ref 135–145)
WBC # BLD: 5.98 K/UL — SIGNIFICANT CHANGE UP (ref 3.8–10.5)
WBC # FLD AUTO: 5.98 K/UL — SIGNIFICANT CHANGE UP (ref 3.8–10.5)

## 2024-08-11 PROCEDURE — 99285 EMERGENCY DEPT VISIT HI MDM: CPT

## 2024-08-11 PROCEDURE — 84702 CHORIONIC GONADOTROPIN TEST: CPT

## 2024-08-11 PROCEDURE — 85027 COMPLETE CBC AUTOMATED: CPT

## 2024-08-11 PROCEDURE — 99284 EMERGENCY DEPT VISIT MOD MDM: CPT | Mod: 25

## 2024-08-11 PROCEDURE — 70487 CT MAXILLOFACIAL W/DYE: CPT | Mod: MC

## 2024-08-11 PROCEDURE — 36415 COLL VENOUS BLD VENIPUNCTURE: CPT

## 2024-08-11 PROCEDURE — 96374 THER/PROPH/DIAG INJ IV PUSH: CPT | Mod: XU

## 2024-08-11 PROCEDURE — 70487 CT MAXILLOFACIAL W/DYE: CPT | Mod: 26,MC

## 2024-08-11 PROCEDURE — 80053 COMPREHEN METABOLIC PANEL: CPT

## 2024-08-11 RX ORDER — ACETAMINOPHEN 500 MG
1000 TABLET ORAL ONCE
Refills: 0 | Status: COMPLETED | OUTPATIENT
Start: 2024-08-11 | End: 2024-08-11

## 2024-08-11 RX ADMIN — Medication 1 TABLET(S): at 16:31

## 2024-08-11 RX ADMIN — Medication 400 MILLIGRAM(S): at 11:45

## 2024-08-11 NOTE — ED PROVIDER NOTE - NSFOLLOWUPINSTRUCTIONS_ED_ALL_ED_FT
You have been evaluated in the Emergency Department today for jaw/neck swelling. Your evaluation, including CT scan , suggests that your symptoms are due to Parotitis.    Please take your antibiotics as prescribed, you can pick them up from the pharmacy tomorrow.     Please follow up with your primary care physician within two days.    Return to the Emergency Department if you experience worsening pain, difficulty opening the mouth, worsening redness, high fever, or any other concerning symptoms.

## 2024-08-11 NOTE — ED PROVIDER NOTE - PROGRESS NOTE DETAILS
CT max face suspicious for parotitis,gave patient a dose of augmentin here in the ER, will discharge with prescription for augmentin 5 days and strict return precautions.

## 2024-08-11 NOTE — ED PROVIDER NOTE - PATIENT PORTAL LINK FT
You can access the FollowMyHealth Patient Portal offered by [facility] by registering at the following website: [email or web address]. By joining [employer]’s FollowMyHealth portal, you will also be able to view your health information using other applications (apps) compatible with our system.

## 2024-08-11 NOTE — ED PROVIDER NOTE - CLINICAL SUMMARY MEDICAL DECISION MAKING FREE TEXT BOX
49 year old female with PMH of HTN presents to the ED for evaluation of left sided jaw/neck pain since this morning. Patient states she woke up this morning with jaw/neck pain worsened by chewing and palpation, alleviated by rest. She reports she had a sore throat a week ago that has since improved. Denies any trauma to the area. No fever, chills, trismus, throat pain, difficulty swallowing. Plan is to get cbc, cmp, CT max face to assess for underlying abscess/infection/mass and reassess.

## 2024-08-11 NOTE — ED ADULT NURSE NOTE - OBJECTIVE STATEMENT
50 y/o female came to the ED with complaints of left sided jaw and neck pain since this am. Pain increases when chewing and to palpation. no difficulty swallowing secretions and PO intake. Denies throat pain, fevers, chills, headache, SOB, ear pain, CP, SOB.

## 2024-08-11 NOTE — ED PROVIDER NOTE - SKIN, MLM
+ left jaw/neck swelling tender to palpation, not erythematous, not warm to touch. Skin normal color for race, warm, dry and intact. No evidence of rash.

## 2024-08-11 NOTE — ED PROVIDER NOTE - OBJECTIVE STATEMENT
49 year old female with PMH of HTN presents to the ED for evaluation of left sided jaw/neck pain since this morning. Patient states she woke up this morning with jaw/neck pain worsened by chewing and palpation, alleviated by rest. She reports she had a sore throat a week ago that has since improved. Denies any trauma to the area. Denies any fever, chills, ear pain, SOB, difficulty swallowing, abdominal pain, headache or other concerning symptoms. No recent weight loss. [place] tooth extraction 6 months ago no complications.

## 2024-08-11 NOTE — ED PROVIDER NOTE - ATTENDING CONTRIBUTION TO CARE
On the left sided neck pain and swelling  There is swelling and tenderness with no redness over the lateral face both the superior and inferior to the angle of mandible with no abnormal ear findings nor mastoid findings.  Probably parotitis or sialolithiasis however cannot rule out abscess.  Will do CT scan of the maxillofacial with IV contrast CBC CMP and symptomatic treatment.

## 2024-12-03 ENCOUNTER — NON-APPOINTMENT (OUTPATIENT)
Age: 49
End: 2024-12-03

## 2024-12-05 ENCOUNTER — APPOINTMENT (OUTPATIENT)
Dept: ORTHOPEDIC SURGERY | Facility: CLINIC | Age: 49
End: 2024-12-05
Payer: COMMERCIAL

## 2024-12-05 ENCOUNTER — NON-APPOINTMENT (OUTPATIENT)
Age: 49
End: 2024-12-05

## 2024-12-05 VITALS — HEIGHT: 63 IN | BODY MASS INDEX: 24.8 KG/M2 | WEIGHT: 140 LBS

## 2024-12-05 DIAGNOSIS — M51.369: ICD-10-CM

## 2024-12-05 PROCEDURE — 99203 OFFICE O/P NEW LOW 30 MIN: CPT

## 2024-12-05 RX ORDER — DICLOFENAC SODIUM 75 MG/1
75 TABLET, DELAYED RELEASE ORAL
Qty: 60 | Refills: 3 | Status: ACTIVE | COMMUNITY
Start: 2024-12-05 | End: 1900-01-01

## 2024-12-11 NOTE — PHYSICAL EXAM
[Normal] : Gait: normal [de-identified] : General: well-appearing, not in acute distress, not obese, in a normal mood, with normal affect and normal coordination. \par Gait: normal. \par Spine: 5 out of 5 motor strength, sensation is intact and symmetrical full range of motion flexion extension and rotation, no palpatory tenderness full range of motion of hips knees shoulders and elbows (all four extremities), no atrophy, negative straight leg raise, no pathological reflexes, no swelling, normal ambulation, no apparent distress skin is intact, no palpable lymph nodes, no upper or lower extremity instability, alert and oriented x3 and normal mood. Normal finger-to nose test. \par Range of Motion: is full. \par Special Tests - negative Landry's sign, negative pronator drift, negative straight leg raise \par Motor: 5/5 motor strength in bilateral upper & lower extremities. Sensory: Intact in bilateral upper & lower extremities. DTRs: Biceps, brachioradialis, triceps, patellar, ankle and plantar 2+ and symmetric bilaterally. Pulses: dorsalis pedis, posterior tibial, femoral, popliteal, and radial 2+ and symmetric bilaterally. \par  [de-identified] : AP/lat lumbar-adequate\par AP/lat cervical-mild degenerative changes-reviewed with patient.  no

## 2025-03-24 NOTE — ED PROVIDER NOTE - DATE/TIME 1
Tammie Mohr is a 33 year old female who presents for fatigue ( Pt c/o of fatigue on and off for two week pt states she went to the ER on 03/08 )    HPI  The patient presents today for evaluation of allergies, symptoms of fatigue, and chills.    She has been experiencing these symptoms for approximately 1 week. She reports no current issues with vomiting, diarrhea, or abdominal pain. She also reports no cough or runny nose. She sought emergency care on 03/08/2025, where blood tests were conducted, and a viral infection was suspected. She has conducted home pregnancy tests, which were negative, even though she has not missed her period. Her last menstrual period was in mid-February 2025. She recalls a single episode of chills after leaving work but has not experienced any further episodes since then. She reports no excessive stress or sleep disturbances related to these symptoms. She had a single episode of diarrhea, followed by a runny nose, and has been noticing these symptoms since then.    Review of Systems   Constitutional:  Positive for fatigue. Negative for activity change, appetite change, chills, diaphoresis and fever.   HENT:  Negative for congestion, dental problem, ear discharge, ear pain, nosebleeds, postnasal drip, rhinorrhea, sinus pressure and sinus pain.    Respiratory:  Negative for cough, shortness of breath and wheezing.    Cardiovascular:  Negative for chest pain, palpitations and leg swelling.   Gastrointestinal:  Negative for abdominal distention, abdominal pain and nausea.   Genitourinary:  Negative for dysuria, flank pain and frequency.   Musculoskeletal:  Negative for arthralgias, back pain and neck pain.   Skin:  Negative for rash.   Neurological:  Negative for dizziness and weakness.         Objective   Vitals:    03/11/25 1634   BP: 121/75   Pulse: 87   Resp: 18   Temp: 98.9 °F (37.2 °C)   SpO2: 99%   Weight: 88 kg (194 lb 0.1 oz)   Height: 5' 6\" (1.676 m)   BMI (Calculated):  31.31     Physical Exam  Vitals and nursing note reviewed.   Constitutional:       Appearance: She is well-developed.   HENT:      Head: Normocephalic and atraumatic.      Right Ear: External ear normal.      Left Ear: External ear normal.      Nose: Nose normal. No congestion or rhinorrhea.      Mouth/Throat:      Mouth: Mucous membranes are moist.      Neck: Neck supple.   Eyes:      General: No scleral icterus.     Conjunctiva/sclera: Conjunctivae normal.      Pupils: Pupils are equal, round, and reactive to light.   Neck:      Thyroid: No thyromegaly.      Trachea: No tracheal deviation.   Cardiovascular:      Rate and Rhythm: Normal rate and regular rhythm.      Pulses: Normal pulses.      Heart sounds: Normal heart sounds. No murmur heard.     No friction rub. No gallop.   Pulmonary:      Effort: Pulmonary effort is normal. No respiratory distress.      Breath sounds: Normal breath sounds. No stridor. No wheezing or rales.   Chest:      Chest wall: No tenderness.   Abdominal:      General: There is no distension.      Palpations: Abdomen is soft. There is no mass.      Tenderness: There is no abdominal tenderness. There is no guarding or rebound.   Musculoskeletal:         General: No tenderness or deformity. Normal range of motion.   Lymphadenopathy:      Cervical: No cervical adenopathy.   Skin:     Coloration: Skin is not pale.      Findings: No erythema or rash.   Neurological:      Mental Status: She is alert and oriented to person, place, and time.      Motor: No abnormal muscle tone.      Coordination: Coordination normal.   Psychiatric:         Behavior: Behavior normal.         Thought Content: Thought content normal.             Laboratory Studies  Blood test from emergency room showed no significant abnormality.    Assessment & Plan   1. Fatigue.  Her fatigue may be a residual effect of a recent viral infection. She is not exhibiting any clinical signs of dehydration, such as fever, cough, nasal  congestion, or abdominal pain. Blood tests conducted at [facility] during her emergency room visit revealed no significant abnormalities. She has been advised to maintain adequate hydration. A prescription for an unspecified medication has been provided. A work note has been issued, excusing her from work for the next 2 days. If her symptoms persist beyond 2 to 3 days, she should seek further medical attention from her primary care physician.    1. Malaise and fatigue  Medical compliance with plan discussed and risks of non-compliance reviewed.  Patient education completed on disease process, etiology & prognosis.  Proper usage and side effects of medications reviewed & discussed  Patient is recommended to return to walk-in clinic or go to emergency room if there is any worsening of symptoms, worrisome symptoms suggesting worsening were discussed with the patient also recommended follow up with   PCP if symptoms persist      Dictation Disclaimer  Please note that computer voice recognition software was used for dictation in the composition of this patient's chart. While every effort is made to ensure correction of grammatical,  other interpretive errors, occasional dictation errors may be missed during proofreading.    22-Jun-2018 17:02

## 2025-04-29 ENCOUNTER — APPOINTMENT (OUTPATIENT)
Dept: OBGYN | Facility: CLINIC | Age: 50
End: 2025-04-29
Payer: COMMERCIAL

## 2025-04-29 ENCOUNTER — NON-APPOINTMENT (OUTPATIENT)
Age: 50
End: 2025-04-29

## 2025-04-29 VITALS — SYSTOLIC BLOOD PRESSURE: 156 MMHG | DIASTOLIC BLOOD PRESSURE: 90 MMHG | BODY MASS INDEX: 25.15 KG/M2 | WEIGHT: 142 LBS

## 2025-04-29 VITALS — DIASTOLIC BLOOD PRESSURE: 78 MMHG | SYSTOLIC BLOOD PRESSURE: 130 MMHG

## 2025-04-29 DIAGNOSIS — Z01.419 ENCOUNTER FOR GYNECOLOGICAL EXAMINATION (GENERAL) (ROUTINE) W/OUT ABNORMAL FINDINGS: ICD-10-CM

## 2025-04-29 PROCEDURE — 99396 PREV VISIT EST AGE 40-64: CPT

## 2025-04-29 RX ORDER — NORGESTIMATE AND ETHINYL ESTRADIOL 0.25-0.035
0.25-35 KIT ORAL DAILY
Qty: 3 | Refills: 3 | Status: ACTIVE | COMMUNITY
Start: 2025-04-29 | End: 1900-01-01

## 2025-05-06 NOTE — ED PROVIDER NOTE - CONSTITUTIONAL DISTRESS
Mikaelar called to speak with patient regarding her upcoming surgery with Dr. Munoz. Writer verified surgery date and time with the patient (5/20/2025 at 0930, arrive at 0700), as well as where patient is to come the day of surgery.  Writer informed patient that an order for a brace was sent to [facility] and she will be receiving a call from them if she hasn't already. Writer instructed patient to bring her brace with her on the day of surgery. Writer verified with patient that she was cleared for surgery by Marcia Delatorre NP pending lab work results. Our office will check back after her lab appointment to confirm that patient is cleared for surgery.  Writer instructed patient to stop taking and any blood thinning medications such as aspirin, ibuprofen (Advil), naproxen (Aleve), fish oil, multivitamins, and supplements at least 7 days prior to surgery. Writer reminded patient that she should have nothing to eat or drink after 10pm the night prior to surgery, and if she has medications to take the morning of surgery, to use only a small sip of water. Mikaelar informed patient that Dr. Munoz will order a bone growth stimulator for her after surgery and she will receive a call from one of the [employer] reps a couple weeks after she is home from the hospital. Gerda verbalized understanding and appreciated the call. Writer welcomed patient to call back with any further questions.     no apparent

## 2025-08-01 ENCOUNTER — APPOINTMENT (OUTPATIENT)
Dept: MAMMOGRAPHY | Facility: CLINIC | Age: 50
End: 2025-08-01
Payer: COMMERCIAL

## 2025-08-01 ENCOUNTER — RESULT REVIEW (OUTPATIENT)
Age: 50
End: 2025-08-01

## 2025-08-01 ENCOUNTER — APPOINTMENT (OUTPATIENT)
Dept: ULTRASOUND IMAGING | Facility: CLINIC | Age: 50
End: 2025-08-01

## 2025-08-01 ENCOUNTER — OUTPATIENT (OUTPATIENT)
Dept: OUTPATIENT SERVICES | Facility: HOSPITAL | Age: 50
LOS: 1 days | End: 2025-08-01
Payer: COMMERCIAL

## 2025-08-01 DIAGNOSIS — Z01.419 ENCOUNTER FOR GYNECOLOGICAL EXAMINATION (GENERAL) (ROUTINE) WITHOUT ABNORMAL FINDINGS: ICD-10-CM

## 2025-08-01 DIAGNOSIS — Z98.890 OTHER SPECIFIED POSTPROCEDURAL STATES: Chronic | ICD-10-CM

## 2025-08-01 DIAGNOSIS — K08.499 PARTIAL LOSS OF TEETH DUE TO OTHER SPECIFIED CAUSE, UNSPECIFIED CLASS: Chronic | ICD-10-CM

## 2025-08-01 PROCEDURE — 77063 BREAST TOMOSYNTHESIS BI: CPT

## 2025-08-01 PROCEDURE — 77067 SCR MAMMO BI INCL CAD: CPT

## 2025-08-01 PROCEDURE — 77063 BREAST TOMOSYNTHESIS BI: CPT | Mod: 26

## 2025-08-01 PROCEDURE — 76641 ULTRASOUND BREAST COMPLETE: CPT

## 2025-08-01 PROCEDURE — 76641 ULTRASOUND BREAST COMPLETE: CPT | Mod: 26,50

## 2025-08-01 PROCEDURE — 77067 SCR MAMMO BI INCL CAD: CPT | Mod: 26

## 2025-08-07 ENCOUNTER — OUTPATIENT (OUTPATIENT)
Dept: OUTPATIENT SERVICES | Facility: HOSPITAL | Age: 50
LOS: 1 days | End: 2025-08-07
Payer: COMMERCIAL

## 2025-08-07 ENCOUNTER — APPOINTMENT (OUTPATIENT)
Dept: MAMMOGRAPHY | Facility: CLINIC | Age: 50
End: 2025-08-07
Payer: COMMERCIAL

## 2025-08-07 ENCOUNTER — RESULT REVIEW (OUTPATIENT)
Age: 50
End: 2025-08-07

## 2025-08-07 ENCOUNTER — APPOINTMENT (OUTPATIENT)
Dept: ULTRASOUND IMAGING | Facility: CLINIC | Age: 50
End: 2025-08-07
Payer: COMMERCIAL

## 2025-08-07 DIAGNOSIS — R92.8 OTHER ABNORMAL AND INCONCLUSIVE FINDINGS ON DIAGNOSTIC IMAGING OF BREAST: ICD-10-CM

## 2025-08-07 DIAGNOSIS — Z00.8 ENCOUNTER FOR OTHER GENERAL EXAMINATION: ICD-10-CM

## 2025-08-07 DIAGNOSIS — K08.499 PARTIAL LOSS OF TEETH DUE TO OTHER SPECIFIED CAUSE, UNSPECIFIED CLASS: Chronic | ICD-10-CM

## 2025-08-07 DIAGNOSIS — Z98.890 OTHER SPECIFIED POSTPROCEDURAL STATES: Chronic | ICD-10-CM

## 2025-08-07 PROCEDURE — G0279: CPT | Mod: 26

## 2025-08-07 PROCEDURE — 77065 DX MAMMO INCL CAD UNI: CPT | Mod: 26,LT

## 2025-08-07 PROCEDURE — 76642 ULTRASOUND BREAST LIMITED: CPT | Mod: 26,LT

## 2025-08-07 PROCEDURE — 77065 DX MAMMO INCL CAD UNI: CPT

## 2025-08-07 PROCEDURE — 76642 ULTRASOUND BREAST LIMITED: CPT

## 2025-08-07 PROCEDURE — G0279: CPT
